# Patient Record
Sex: FEMALE | Race: WHITE | NOT HISPANIC OR LATINO | Employment: FULL TIME | URBAN - METROPOLITAN AREA
[De-identification: names, ages, dates, MRNs, and addresses within clinical notes are randomized per-mention and may not be internally consistent; named-entity substitution may affect disease eponyms.]

---

## 2019-11-06 PROBLEM — R60.9 EDEMA: Status: ACTIVE | Noted: 2018-08-03

## 2019-11-06 PROBLEM — R53.83 FATIGUE: Status: ACTIVE | Noted: 2018-09-04

## 2019-11-06 PROBLEM — E03.9 HYPOTHYROIDISM: Status: ACTIVE | Noted: 2018-09-04

## 2019-11-06 PROBLEM — E04.1 THYROID NODULE: Status: ACTIVE | Noted: 2018-09-04

## 2019-11-06 RX ORDER — MULTIVIT-MIN/IRON/FOLIC ACID/K 18-600-40
1 CAPSULE ORAL EVERY 24 HOURS
COMMUNITY
Start: 2018-09-04 | End: 2019-11-12

## 2019-11-06 RX ORDER — MAGNESIUM 200 MG
TABLET ORAL EVERY 24 HOURS
COMMUNITY
Start: 2018-09-04 | End: 2021-12-01

## 2019-11-06 RX ORDER — LEVOTHYROXINE SODIUM 0.12 MG/1
TABLET ORAL EVERY 24 HOURS
COMMUNITY
Start: 2019-05-21 | End: 2019-12-03 | Stop reason: SDUPTHER

## 2019-11-11 ENCOUNTER — TELEPHONE (OUTPATIENT)
Dept: ENDOCRINOLOGY | Facility: CLINIC | Age: 39
End: 2019-11-11

## 2019-11-12 ENCOUNTER — OFFICE VISIT (OUTPATIENT)
Dept: ENDOCRINOLOGY | Facility: CLINIC | Age: 39
End: 2019-11-12

## 2019-11-12 VITALS
OXYGEN SATURATION: 99 % | HEIGHT: 63 IN | DIASTOLIC BLOOD PRESSURE: 75 MMHG | HEART RATE: 86 BPM | BODY MASS INDEX: 38.62 KG/M2 | WEIGHT: 218 LBS | SYSTOLIC BLOOD PRESSURE: 118 MMHG

## 2019-11-12 DIAGNOSIS — R53.83 FATIGUE, UNSPECIFIED TYPE: ICD-10-CM

## 2019-11-12 DIAGNOSIS — E03.8 HYPOTHYROIDISM DUE TO HASHIMOTO'S THYROIDITIS: Primary | ICD-10-CM

## 2019-11-12 DIAGNOSIS — L68.0 HIRSUTISM: ICD-10-CM

## 2019-11-12 DIAGNOSIS — E04.1 THYROID NODULE: ICD-10-CM

## 2019-11-12 DIAGNOSIS — E06.3 HYPOTHYROIDISM DUE TO HASHIMOTO'S THYROIDITIS: Primary | ICD-10-CM

## 2019-11-12 PROCEDURE — 99214 OFFICE O/P EST MOD 30 MIN: CPT | Performed by: INTERNAL MEDICINE

## 2019-11-12 RX ORDER — DEXTROMETHORPHAN HYDROBROMIDE AND PROMETHAZINE HYDROCHLORIDE 15; 6.25 MG/5ML; MG/5ML
5 SYRUP ORAL
COMMUNITY
Start: 2017-12-09 | End: 2019-11-12

## 2019-11-12 RX ORDER — DEXAMETHASONE 1 MG
TABLET ORAL
Qty: 1 TABLET | Refills: 0 | OUTPATIENT
Start: 2019-11-12 | End: 2021-12-01

## 2019-11-12 RX ORDER — TOPIRAMATE 50 MG/1
TABLET, FILM COATED ORAL EVERY 12 HOURS SCHEDULED
COMMUNITY
End: 2021-12-01

## 2019-11-12 NOTE — PROGRESS NOTES
Endocrine Progress Note Outpatient     Patient Care Team:  Mira Faulkner APRN as PCP - General    Chief Complaint: Follow-up hypothyroidism    HPI: 39-year-old female with history of hypothyroidism second to Hashimoto's thyroiditis is here for follow-up.  She also have a small thyroid not in the right side.    For hypothyroidism she is currently on levothyroxine 137 mcg p.o. daily.  She is telling me that she is taking it on regular basis.  She does feel like extreme fatigue and tiredness, dry skin, hair loss, constipation joint pain, muscle aches, weight is fluctuating, cold intolerance.      Thyroid nodule she has a small subcentimeter on the right side.  There is family history with her aunt having history of thyroid cancer, she denies any history of radiation exposure, she does have a hoarseness in the morning when she wakes up and she feels like you know she may have some trouble swallowing.      She does have hirsutism,, she has been shaving up looking for last 7 years, she does not have major issues with acne.  Her periods were regular but now she had ablation done.  She had 3 babies/pregnancies without any difficulty.  She does have mood swings.  Is excessive sweating.    Past Medical History:   Diagnosis Date   • Hypothyroidism    • Thyroid nodule        Social History     Socioeconomic History   • Marital status:      Spouse name: Not on file   • Number of children: Not on file   • Years of education: Not on file   • Highest education level: Not on file   Tobacco Use   • Smoking status: Never Smoker   Substance and Sexual Activity   • Alcohol use: No     Frequency: Never       Family History   Problem Relation Age of Onset   • Hypertension Mother    • Heart failure Mother    • COPD Mother    • Diabetes Father    • Thyroid disease Sister    • Diabetes Paternal Grandfather    • Heart disease Paternal Grandfather    • Prostate cancer Paternal Grandfather        No Known Allergies    ROS:    Constitutional:  Admit fatigue, tiredness.    Eyes:  Denies change in visual acuity   HENT:  Denies nasal congestion or sore throat   Respiratory: denies cough, shortness of breath.   Cardiovascular:  denies chest pain, edema   GI:  Denies abdominal pain, nausea, vomiting.   Musculoskeletal:  Denies back pain or joint pain   Integument:  Admit dry skin, denies rash   Neurologic:  Denies headache, focal weakness or sensory changes   Endocrine:  Admit polyuria and polydipsia   Psychiatric:  Denies depression or anxiety      Vitals:    11/12/19 0831   BP: 118/75   Pulse: 86   SpO2: 99%       Physical Exam:  GEN: NAD, conversant, obese  EYES: EOMI, PERRL, no conjunctival erythema  NECK: no thyromegaly, full ROM   CV: RRR, no murmurs/rubs/gallops, no peripheral edema  LUNG: CTAB, no wheezes/rales/ronchi  SKIN: no rashes, no acanthosis  MSK: no deformities, full ROM of all extremities  NEURO: no tremors, DTR normal  PSYCH: AOX3, appropriate mood, affect normal      Results Review:     I reviewed the patient's new clinical results.    Labs from November 6, 2019 showed a TSH of 2.86, free T4 1.3, free T3 2.5, TPO antibodies 148 and vitamin B12 was 339  A thyroid ultrasound done on November 4, 2019 showed 8.9 cm hypoechoic nodule with a slightly irregular margin and there was no evidence of calcification and this nodule has mild increase in size.  Previously noted nodule was about 0.5 cm.      Medication Review: Reviewed.       Current Outpatient Medications:   •  Cyanocobalamin (B-12) 1000 MCG sublingual tablet, Daily., Disp: , Rfl:   •  levothyroxine (SYNTHROID, LEVOTHROID) 125 MCG tablet, Daily., Disp: , Rfl:   •  topiramate (TOPAMAX) 50 MG tablet, Every 12 (Twelve) Hours., Disp: , Rfl:       Assessment/Plan   1.  Hypothyroidism: Second to Hashimoto's thyroiditis, her TSH is completely normal with normal free T4 and free T3.  I do not believe her symptoms are due to thyroid at this time.  I will continue levothyroxine  125 mcg p.o. daily and follow labs.  2.  Thyroid nodule: She has a 9 mm hypoechoic nodule with a slightly irregular margins.  She has an aunt with history of thyroid cancer and this nodule has increased in size from 5 mm to 9 mm.  I am little bit concerned about this so I recommend biopsy of this nodule.  She has appointment with ENT week Monday and she will discuss with them.  I have advised her to call me back if they decide not to do biopsy then I will arrange for the biopsy.  3.  Excessive fatigue with hirsutism: Rule out PCOS, at this time I will check CMP, CBC, total and free testosterone, DHEAs, LH, FSH and dexamethasone suppression test for further evaluation.  Have advised her to continue vitamin B12 supplementation.          Marian Gillis MD FACE.    Much of the above report is an electronic transcription/translation of the spoken language to printed text using Dragon Software. As such, the subtleties and finesse of the spoken language may permit erroneous, or at times, nonsensical words or phrases to be inadvertently transcribed; thus changes may be made at a later date to rectify these errors.

## 2019-11-12 NOTE — PATIENT INSTRUCTIONS
Please start vitamin B12 supplementation  Please work on your diet and avoid processed foods  Please get all labs done fasting in next few days make sure you take dexamethasone 1 mg night before 11 PM and draw your serum cortisol next morning at 8 AM.  Please discuss with your ENT regarding the biopsy of this right 9 mm nodule and if they do not want to the left me know and I will arrange it.

## 2019-11-14 ENCOUNTER — TELEPHONE (OUTPATIENT)
Dept: ENDOCRINOLOGY | Facility: CLINIC | Age: 39
End: 2019-11-14

## 2019-11-14 NOTE — TELEPHONE ENCOUNTER
Pt just had CBC and CMP done 11/7 (scanned to chart). She wants to know if these need to be repeated?

## 2019-11-15 ENCOUNTER — LAB (OUTPATIENT)
Dept: LAB | Facility: HOSPITAL | Age: 39
End: 2019-11-15

## 2019-11-15 DIAGNOSIS — E03.8 HYPOTHYROIDISM DUE TO HASHIMOTO'S THYROIDITIS: ICD-10-CM

## 2019-11-15 DIAGNOSIS — R53.83 FATIGUE, UNSPECIFIED TYPE: ICD-10-CM

## 2019-11-15 DIAGNOSIS — E06.3 HYPOTHYROIDISM DUE TO HASHIMOTO'S THYROIDITIS: ICD-10-CM

## 2019-11-15 DIAGNOSIS — E04.1 THYROID NODULE: ICD-10-CM

## 2019-11-15 DIAGNOSIS — L68.0 HIRSUTISM: ICD-10-CM

## 2019-11-15 LAB
CORTIS SERPL-MCNC: 0.69 MCG/DL
FSH SERPL-ACNC: 1.81 MIU/ML
LH SERPL-ACNC: 2.85 MIU/ML

## 2019-11-15 PROCEDURE — 83001 ASSAY OF GONADOTROPIN (FSH): CPT

## 2019-11-15 PROCEDURE — 83002 ASSAY OF GONADOTROPIN (LH): CPT

## 2019-11-15 PROCEDURE — 82533 TOTAL CORTISOL: CPT

## 2019-11-15 PROCEDURE — 84403 ASSAY OF TOTAL TESTOSTERONE: CPT

## 2019-11-15 PROCEDURE — 84402 ASSAY OF FREE TESTOSTERONE: CPT

## 2019-11-15 PROCEDURE — 36415 COLL VENOUS BLD VENIPUNCTURE: CPT

## 2019-11-15 PROCEDURE — 82627 DEHYDROEPIANDROSTERONE: CPT

## 2019-11-16 LAB — DHEA-S SERPL-MCNC: 101.3 UG/DL (ref 57.3–279.2)

## 2019-11-17 LAB
TESTOST FREE SERPL-MCNC: 0.3 PG/ML (ref 0–4.2)
TESTOST SERPL-MCNC: 19 NG/DL (ref 8–48)

## 2019-11-19 ENCOUNTER — TELEPHONE (OUTPATIENT)
Dept: ENDOCRINOLOGY | Facility: CLINIC | Age: 39
End: 2019-11-19

## 2019-11-19 DIAGNOSIS — E06.3 HYPOTHYROIDISM DUE TO HASHIMOTO'S THYROIDITIS: ICD-10-CM

## 2019-11-19 DIAGNOSIS — E03.8 HYPOTHYROIDISM DUE TO HASHIMOTO'S THYROIDITIS: ICD-10-CM

## 2019-11-19 DIAGNOSIS — E04.1 THYROID NODULE: Primary | ICD-10-CM

## 2019-11-19 NOTE — TELEPHONE ENCOUNTER
Pt states that she saw Dr. Reis. He will not bx thyroid nodule. Pt states that she was told to let you know.

## 2019-11-19 NOTE — TELEPHONE ENCOUNTER
Ok.  If she wants to proceed with biopsy I will be happy to arrange it otherwise we can follow this nodule in about 4 to 6 months with ultrasound.

## 2019-11-21 NOTE — TELEPHONE ENCOUNTER
S/w pt. She prefers to do thyroid us in 4-6 months. Order entered and will fax to [facility] when signed. She will call to schedule.    **order signed and faxed to [facility]

## 2019-12-03 RX ORDER — LEVOTHYROXINE SODIUM 0.12 MG/1
TABLET ORAL
Qty: 90 TABLET | Refills: 0 | Status: SHIPPED | OUTPATIENT
Start: 2019-12-03 | End: 2020-03-16

## 2020-03-16 RX ORDER — LEVOTHYROXINE SODIUM 0.12 MG/1
125 TABLET ORAL DAILY
Qty: 30 TABLET | Refills: 0 | Status: SHIPPED | OUTPATIENT
Start: 2022-07-21 | End: 2022-12-20 | Stop reason: SDUPTHER

## 2020-06-09 ENCOUNTER — APPOINTMENT (OUTPATIENT)
Dept: CT IMAGING | Facility: HOSPITAL | Age: 40
End: 2020-06-09

## 2020-06-09 ENCOUNTER — HOSPITAL ENCOUNTER (EMERGENCY)
Facility: HOSPITAL | Age: 40
Discharge: HOME | End: 2020-06-09
Attending: EMERGENCY MEDICINE | Admitting: EMERGENCY MEDICINE

## 2020-06-09 VITALS
OXYGEN SATURATION: 99 % | RESPIRATION RATE: 16 BRPM | HEIGHT: 63 IN | BODY MASS INDEX: 38.55 KG/M2 | WEIGHT: 217.59 LBS | TEMPERATURE: 98.1 F | DIASTOLIC BLOOD PRESSURE: 65 MMHG | HEART RATE: 86 BPM | SYSTOLIC BLOOD PRESSURE: 111 MMHG

## 2020-06-09 DIAGNOSIS — R11.2 NAUSEA AND VOMITING, INTRACTABILITY OF VOMITING NOT SPECIFIED, UNSPECIFIED VOMITING TYPE: ICD-10-CM

## 2020-06-09 DIAGNOSIS — R10.84 GENERALIZED ABDOMINAL PAIN: Primary | ICD-10-CM

## 2020-06-09 LAB
ANION GAP SERPL CALCULATED.3IONS-SCNC: 14 MMOL/L (ref 5–15)
ANISOCYTOSIS BLD QL: NORMAL
BASOPHILS # BLD AUTO: 0 10*3/MM3 (ref 0–0.2)
BASOPHILS NFR BLD AUTO: 0.1 % (ref 0–1.5)
BILIRUB UR QL STRIP: NEGATIVE
BUN BLD-MCNC: 20 MG/DL (ref 6–20)
BUN BLD-MCNC: ABNORMAL MG/DL
BUN/CREAT SERPL: ABNORMAL
CALCIUM SPEC-SCNC: 9.5 MG/DL (ref 8.6–10.5)
CHLORIDE SERPL-SCNC: 101 MMOL/L (ref 98–107)
CLARITY UR: CLEAR
CO2 SERPL-SCNC: 23 MMOL/L (ref 22–29)
COLOR UR: YELLOW
CREAT BLD-MCNC: 0.76 MG/DL (ref 0.57–1)
DEPRECATED RDW RBC AUTO: 39.4 FL (ref 37–54)
EOSINOPHIL # BLD AUTO: 0 10*3/MM3 (ref 0–0.4)
EOSINOPHIL NFR BLD AUTO: 0.2 % (ref 0.3–6.2)
ERYTHROCYTE [DISTWIDTH] IN BLOOD BY AUTOMATED COUNT: 12.8 % (ref 12.3–15.4)
GFR SERPL CREATININE-BSD FRML MDRD: 85 ML/MIN/1.73
GLUCOSE BLD-MCNC: 123 MG/DL (ref 65–99)
GLUCOSE UR STRIP-MCNC: NEGATIVE MG/DL
HCT VFR BLD AUTO: 45.3 % (ref 34–46.6)
HGB BLD-MCNC: 15.5 G/DL (ref 12–15.9)
HGB UR QL STRIP.AUTO: NEGATIVE
KETONES UR QL STRIP: ABNORMAL
LARGE PLATELETS: NORMAL
LEUKOCYTE ESTERASE UR QL STRIP.AUTO: NEGATIVE
LYMPHOCYTES # BLD AUTO: 0.6 10*3/MM3 (ref 0.7–3.1)
LYMPHOCYTES NFR BLD AUTO: 4.2 % (ref 19.6–45.3)
MCH RBC QN AUTO: 30.3 PG (ref 26.6–33)
MCHC RBC AUTO-ENTMCNC: 34.3 G/DL (ref 31.5–35.7)
MCV RBC AUTO: 88.5 FL (ref 79–97)
MONOCYTES # BLD AUTO: 0.2 10*3/MM3 (ref 0.1–0.9)
MONOCYTES NFR BLD AUTO: 1.2 % (ref 5–12)
NEUTROPHILS # BLD AUTO: 13.1 10*3/MM3 (ref 1.7–7)
NEUTROPHILS NFR BLD AUTO: 94.3 % (ref 42.7–76)
NITRITE UR QL STRIP: NEGATIVE
NRBC BLD AUTO-RTO: 0 /100 WBC (ref 0–0.2)
PH UR STRIP.AUTO: 8.5 [PH] (ref 5–8)
PLATELET # BLD AUTO: 183 10*3/MM3 (ref 140–450)
PMV BLD AUTO: 8.8 FL (ref 6–12)
POTASSIUM BLD-SCNC: 4.5 MMOL/L (ref 3.5–5.2)
PROT UR QL STRIP: ABNORMAL
RBC # BLD AUTO: 5.11 10*6/MM3 (ref 3.77–5.28)
SODIUM BLD-SCNC: 138 MMOL/L (ref 136–145)
SP GR UR STRIP: 1.03 (ref 1–1.03)
UROBILINOGEN UR QL STRIP: ABNORMAL
WBC MORPH BLD: NORMAL
WBC NRBC COR # BLD: 13.9 10*3/MM3 (ref 3.4–10.8)

## 2020-06-09 PROCEDURE — 25010000002 ONDANSETRON PER 1 MG: Performed by: EMERGENCY MEDICINE

## 2020-06-09 PROCEDURE — 85007 BL SMEAR W/DIFF WBC COUNT: CPT | Performed by: EMERGENCY MEDICINE

## 2020-06-09 PROCEDURE — 96375 TX/PRO/DX INJ NEW DRUG ADDON: CPT

## 2020-06-09 PROCEDURE — 81003 URINALYSIS AUTO W/O SCOPE: CPT | Performed by: EMERGENCY MEDICINE

## 2020-06-09 PROCEDURE — 74176 CT ABD & PELVIS W/O CONTRAST: CPT

## 2020-06-09 PROCEDURE — 25010000002 MORPHINE PER 10 MG: Performed by: EMERGENCY MEDICINE

## 2020-06-09 PROCEDURE — 80048 BASIC METABOLIC PNL TOTAL CA: CPT | Performed by: EMERGENCY MEDICINE

## 2020-06-09 PROCEDURE — 99283 EMERGENCY DEPT VISIT LOW MDM: CPT

## 2020-06-09 PROCEDURE — 96374 THER/PROPH/DIAG INJ IV PUSH: CPT

## 2020-06-09 PROCEDURE — 85025 COMPLETE CBC W/AUTO DIFF WBC: CPT | Performed by: EMERGENCY MEDICINE

## 2020-06-09 RX ORDER — MORPHINE SULFATE 4 MG/ML
2 INJECTION, SOLUTION INTRAMUSCULAR; INTRAVENOUS ONCE
Status: COMPLETED | OUTPATIENT
Start: 2020-06-09 | End: 2020-06-09

## 2020-06-09 RX ORDER — ONDANSETRON 2 MG/ML
4 INJECTION INTRAMUSCULAR; INTRAVENOUS ONCE
Status: COMPLETED | OUTPATIENT
Start: 2020-06-09 | End: 2020-06-09

## 2020-06-09 RX ORDER — SODIUM CHLORIDE 0.9 % (FLUSH) 0.9 %
10 SYRINGE (ML) INJECTION AS NEEDED
Status: DISCONTINUED | OUTPATIENT
Start: 2020-06-09 | End: 2020-06-09 | Stop reason: HOSPADM

## 2020-06-09 RX ORDER — PANTOPRAZOLE SODIUM 40 MG/1
40 TABLET, DELAYED RELEASE ORAL DAILY
Qty: 14 TABLET | Refills: 0 | OUTPATIENT
Start: 2020-06-09 | End: 2021-12-01

## 2020-06-09 RX ORDER — ONDANSETRON 4 MG/1
4 TABLET, ORALLY DISINTEGRATING ORAL EVERY 8 HOURS PRN
Qty: 12 TABLET | Refills: 0 | OUTPATIENT
Start: 2020-06-09 | End: 2021-12-01

## 2020-06-09 RX ADMIN — ONDANSETRON 4 MG: 2 INJECTION INTRAMUSCULAR; INTRAVENOUS at 14:20

## 2020-06-09 RX ADMIN — MORPHINE SULFATE 2 MG: 4 INJECTION INTRAVENOUS at 14:19

## 2020-06-09 NOTE — ED PROVIDER NOTES
Subjective   Patient is a 40-year-old female complaint abdominal pain and vomiting.  This developed over the past several hours.  Pain is moderate and constant there is no radiation of pain.  Denies cough fever diarrhea dysuria is weight loss or other associated complaints          Review of Systems  Negative for headache earache throat cough fever chest pain shortness of breath diarrhea dysuria denies weight loss or other complaint.  Past Medical History:   Diagnosis Date   • Hypothyroidism    • Thyroid nodule        No Known Allergies    Past Surgical History:   Procedure Laterality Date   •  SECTION      X 3    • GALLBLADDER SURGERY     • OVARY SURGERY      X 6        Family History   Problem Relation Age of Onset   • Hypertension Mother    • Heart failure Mother    • COPD Mother    • Diabetes Father    • Thyroid disease Sister    • Diabetes Paternal Grandfather    • Heart disease Paternal Grandfather    • Prostate cancer Paternal Grandfather        Social History     Socioeconomic History   • Marital status:      Spouse name: Not on file   • Number of children: Not on file   • Years of education: Not on file   • Highest education level: Not on file   Tobacco Use   • Smoking status: Never Smoker   Substance and Sexual Activity   • Alcohol use: No     Frequency: Never           Objective   Physical Exam  HEENT exam shows TMs to be clear.  Oropharynx comers but sclera nonicteric.  Neck has no adenopathy JVD or bruits.  Lungs are clear.  Heart has regular rhythm.  Chest is nontender.  Abdomen is soft with mild diffuse tenderness.  Patient has normal bowel sounds without rebound or guard.  Back has no CVA tenderness.  Procedures           ED Course            Results for orders placed or performed during the hospital encounter of 20   Basic Metabolic Panel   Result Value Ref Range    Glucose 123 (H) 65 - 99 mg/dL    BUN      Creatinine 0.76 0.57 - 1.00 mg/dL    Sodium 138 136 - 145 mmol/L     Potassium 4.5 3.5 - 5.2 mmol/L    Chloride 101 98 - 107 mmol/L    CO2 23.0 22.0 - 29.0 mmol/L    Calcium 9.5 8.6 - 10.5 mg/dL    eGFR Non African Amer 85 >60 mL/min/1.73    BUN/Creatinine Ratio      Anion Gap 14.0 5.0 - 15.0 mmol/L   Urinalysis With Culture If Indicated - Urine, Clean Catch   Result Value Ref Range    Color, UA Yellow Yellow, Straw    Appearance, UA Clear Clear    pH, UA 8.5 (H) 5.0 - 8.0    Specific Gravity, UA 1.026 1.005 - 1.030    Glucose, UA Negative Negative    Ketones, UA 15 mg/dL (1+) (A) Negative    Bilirubin, UA Negative Negative    Blood, UA Negative Negative    Protein, UA Trace (A) Negative    Leuk Esterase, UA Negative Negative    Nitrite, UA Negative Negative    Urobilinogen, UA 0.2 E.U./dL 0.2 - 1.0 E.U./dL   CBC Auto Differential   Result Value Ref Range    WBC 13.90 (H) 3.40 - 10.80 10*3/mm3    RBC 5.11 3.77 - 5.28 10*6/mm3    Hemoglobin 15.5 12.0 - 15.9 g/dL    Hematocrit 45.3 34.0 - 46.6 %    MCV 88.5 79.0 - 97.0 fL    MCH 30.3 26.6 - 33.0 pg    MCHC 34.3 31.5 - 35.7 g/dL    RDW 12.8 12.3 - 15.4 %    RDW-SD 39.4 37.0 - 54.0 fl    MPV 8.8 6.0 - 12.0 fL    Platelets 183 140 - 450 10*3/mm3    Neutrophil % 94.3 (H) 42.7 - 76.0 %    Lymphocyte % 4.2 (L) 19.6 - 45.3 %    Monocyte % 1.2 (L) 5.0 - 12.0 %    Eosinophil % 0.2 (L) 0.3 - 6.2 %    Basophil % 0.1 0.0 - 1.5 %    Neutrophils, Absolute 13.10 (H) 1.70 - 7.00 10*3/mm3    Lymphocytes, Absolute 0.60 (L) 0.70 - 3.10 10*3/mm3    Monocytes, Absolute 0.20 0.10 - 0.90 10*3/mm3    Eosinophils, Absolute 0.00 0.00 - 0.40 10*3/mm3    Basophils, Absolute 0.00 0.00 - 0.20 10*3/mm3    nRBC 0.0 0.0 - 0.2 /100 WBC   Scan Slide   Result Value Ref Range    Anisocytosis Slight/1+ None Seen    WBC Morphology Normal Normal    Large Platelets Slight/1+ None Seen   BUN   Result Value Ref Range    BUN 20 6 - 20 mg/dL     Ct Abdomen Pelvis Without Contrast    Result Date: 6/9/2020   1. No acute findings in the abdomen or pelvis. 2. Nonobstructing bilateral  renal stones. No ureteral stone or hydronephrosis. 3. The pedicles is normal. 4. 2.2 cm right ovarian cyst. 5. 891 noncalcified right lower lobe pulmonary nodule. Given the patient's age and lack of history of malignancy, benign etiology is favored. Following the Fleischner Society criteria for pulmonary nodule management, if this is a low-risk patient, CT chest follow-up in 6-12 months with consideration of additional 18-24 month follow-up is advised.    Electronically Signed By-Dr. Nitza Bhatti MD On:6/9/2020 2:27 PM This report was finalized on [id number] by Dr. Nitza Bhatti MD.                                      MDM  Number of Diagnoses or Management Options  Diagnosis management comments: Patient has a benign physical exam.  There is no evidence of intra-abdominal pathology including infectious process or metabolic abnormality.  Patient will be discharged with placed on Protonix and Zofran.  She will see MD for recheck as needed.    Risk of Complications, Morbidity, and/or Mortality  Presenting problems: high  Diagnostic procedures: high  Management options: high    Patient Progress  Patient progress: stable      Final diagnoses:   Generalized abdominal pain   Nausea and vomiting, intractability of vomiting not specified, unspecified vomiting type            Bean Melton MD  06/09/20 2170

## 2020-06-09 NOTE — ED NOTES
Pt reports that she woke up this morning with RLQ pain states no possibility of pregnancy denies diarrhea states she has n/v      Selina Khoury RN  06/09/20 6794

## 2020-06-10 RX ORDER — ALBUTEROL SULFATE 90 UG/1
AEROSOL, METERED RESPIRATORY (INHALATION) EVERY 4 HOURS
COMMUNITY
End: 2021-12-01

## 2020-06-10 RX ORDER — DEXTROMETHORPHAN HYDROBROMIDE AND PROMETHAZINE HYDROCHLORIDE 15; 6.25 MG/5ML; MG/5ML
5 SYRUP ORAL
COMMUNITY
Start: 2020-02-10 | End: 2021-12-01

## 2020-11-28 PROCEDURE — U0003 INFECTIOUS AGENT DETECTION BY NUCLEIC ACID (DNA OR RNA); SEVERE ACUTE RESPIRATORY SYNDROME CORONAVIRUS 2 (SARS-COV-2) (CORONAVIRUS DISEASE [COVID-19]), AMPLIFIED PROBE TECHNIQUE, MAKING USE OF HIGH THROUGHPUT TECHNOLOGIES AS DESCRIBED BY CMS-2020-01-R: HCPCS | Performed by: NURSE PRACTITIONER

## 2021-12-01 PROCEDURE — U0004 COV-19 TEST NON-CDC HGH THRU: HCPCS | Performed by: NURSE PRACTITIONER

## 2021-12-03 RX ORDER — ALBUTEROL SULFATE 90 UG/1
2 AEROSOL, METERED RESPIRATORY (INHALATION) EVERY 4 HOURS PRN
Qty: 1 G | Refills: 1 | Status: SHIPPED | OUTPATIENT
Start: 2021-12-03 | End: 2022-04-11

## 2021-12-20 PROCEDURE — U0004 COV-19 TEST NON-CDC HGH THRU: HCPCS | Performed by: NURSE PRACTITIONER

## 2022-04-11 ENCOUNTER — OFFICE VISIT (OUTPATIENT)
Dept: CARDIOLOGY | Facility: CLINIC | Age: 42
End: 2022-04-11

## 2022-04-11 VITALS
HEIGHT: 63 IN | HEART RATE: 74 BPM | SYSTOLIC BLOOD PRESSURE: 120 MMHG | OXYGEN SATURATION: 100 % | WEIGHT: 220 LBS | BODY MASS INDEX: 38.98 KG/M2 | DIASTOLIC BLOOD PRESSURE: 80 MMHG

## 2022-04-11 DIAGNOSIS — E06.3 HYPOTHYROIDISM DUE TO HASHIMOTO'S THYROIDITIS: ICD-10-CM

## 2022-04-11 DIAGNOSIS — R60.0 LOCALIZED EDEMA: ICD-10-CM

## 2022-04-11 DIAGNOSIS — R03.0 ELEVATED BLOOD-PRESSURE READING WITHOUT DIAGNOSIS OF HYPERTENSION: Primary | ICD-10-CM

## 2022-04-11 DIAGNOSIS — R53.83 FATIGUE, UNSPECIFIED TYPE: ICD-10-CM

## 2022-04-11 DIAGNOSIS — E03.8 HYPOTHYROIDISM DUE TO HASHIMOTO'S THYROIDITIS: ICD-10-CM

## 2022-04-11 DIAGNOSIS — R03.0 ELEVATED BLOOD PRESSURE READING WITHOUT DIAGNOSIS OF HYPERTENSION: ICD-10-CM

## 2022-04-11 PROCEDURE — 99204 OFFICE O/P NEW MOD 45 MIN: CPT | Performed by: NURSE PRACTITIONER

## 2022-04-11 PROCEDURE — 93000 ELECTROCARDIOGRAM COMPLETE: CPT | Performed by: NURSE PRACTITIONER

## 2022-04-12 PROBLEM — R60.0 BILATERAL LEG EDEMA: Status: ACTIVE | Noted: 2022-04-12

## 2022-04-12 PROBLEM — R03.0 ELEVATED BLOOD-PRESSURE READING WITHOUT DIAGNOSIS OF HYPERTENSION: Status: ACTIVE | Noted: 2022-04-12

## 2022-04-12 NOTE — PROGRESS NOTES
Cardiology Office Consultation      Encounter Date:  04/11/2022    Patient ID:   Keyonna Kumari is a 42 y.o. female.    Reason For Consultation:  Elevated blood pressure    History of Present Illness:  Dear Dr. Kimble, Octavio Crockett MD    It was my pleasure to see Keyonna in new consultation today.  She is a 42-year-old female with no known history of coronary artery disease or structural heart disease.  Her past medical history includes Hashimoto's disease/hypothyroidism, thyroid nodule.  Patient presents today in new consultation for concern of elevated blood pressure.  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  Headache was generalized, nonfocal.  She denies any vision changes, nausea or vomiting.  The staff in the office took her blood pressure and it was quite elevated.  She had no focal deficits.  She reports that her headache lasted for much of the day and eventually subsided.  Her blood pressure improved as well.  She has had no further symptoms.  Given her history of thyroid disease and Hashimoto's disease, she wanted to have further evaluation.  She does have some chronic lower extremity edema for which she takes hydrochlorothiazide with improvement.  There is history of hypertension and congestive heart failure in mother, heart disease in paternal grandfather.  She has had no prior cardiac evaluation.  In the office today her blood pressure is well controlled at 120/80.  She denies any recent chest pain, pressure, tightness or palpitations.  She denies any dizziness, lightheadedness, near syncopal or syncopal episodes.  No claudication.        Assessment & Plan    Impressions:  Elevated blood pressure without the diagnosis of hypertension  Acute cephalgia  Hypothyroidism due to Hashimoto's thyroiditis  Lower extremity edema      Recommendations:  2D echocardiogram to evaluate LV and valvular function.  Evaluate for secondary  "hypertension with urine catecholamines/metanephrines, check TSH, serum aldosterone, CBC, BMP.  Continue hydralazine  Follow-up after testing    Objective:    Vitals:  Vitals:    04/11/22 1450   BP: 120/80   Pulse: 74   SpO2: 100%   Weight: 99.8 kg (220 lb)   Height: 160 cm (63\")       Review of Systems   Constitutional: Positive for malaise/fatigue.   Cardiovascular: Positive for leg swelling.   All other systems reviewed and are negative.      Review of Systems:  The following systems were reviewed as they relate to the cardiovascular system: Constitutional, Eyes, ENT, Cardiovascular, Respiratory, Gastrointestinal, Integumentary, Neurological, Psychiatric, Hematologic, Endocrine, Musculoskeletal, and Genitourinary. The pertinent cardiovascular findings are reported above with all other cardiovascular points within those systems being negative.    Physical Exam:     General: Alert, cooperative, no distress, appears stated age  Head:  Normocephalic, atraumatic, mucous membranes moist  Eyes:  Conjunctiva/corneas clear, EOM's intact     Neck:  Supple,  no adenopathy;      Lungs: Clear to auscultation bilaterally, no wheezes rhonchi rales are noted  Chest wall: No tenderness  Heart::  Regular rate and rhythm, S1 and S2 normal, no murmur, rub or gallop  Musculoskeletal:   Ambulates freely without assistance  Abdomen: Soft, non-tender, nondistended bowel sounds active  Extremities: No cyanosis, clubbing, or edema  Pulses: 2+ and symmetric all extremities  Skin:  No rashes or lesions  Neuro/psych: A&O x3. CN II through XII are grossly intact with appropriate affect    History of Present Illness      ECG 12 Lead    Date/Time: 4/11/2022 2:55 PM  Performed by: Barbara Hickey APRN  Authorized by: Barbara Hickey APRN   Comparison: not compared with previous ECG   Previous ECG: no previous ECG available  Rhythm: sinus rhythm  BPM: 76              Allergies:  No Known Allergies    Medication Review:     Current " "Outpatient Medications:   •  hydroCHLOROthiazide (HYDRODIURIL) 25 MG tablet, hydrochlorothiazide 25 mg tablet  TAKE 1 TABLET BY MOUTH EVERY DAY, Disp: , Rfl:   •  levothyroxine (SYNTHROID, LEVOTHROID) 125 MCG tablet, TAKE 1 TABLET BY MOUTH DAILY, Disp: 90 tablet, Rfl: 1    Family History:  Family History   Problem Relation Age of Onset   • Hypertension Mother    • Heart failure Mother    • COPD Mother    • Diabetes Father    • Thyroid disease Sister    • Diabetes Paternal Grandfather    • Heart disease Paternal Grandfather    • Prostate cancer Paternal Grandfather        Past Medical History:  Past Medical History:   Diagnosis Date   • Hashimoto's disease    • Hashimoto's disease    • Hypothyroidism    • Thyroid nodule        Past surgical History:  Past Surgical History:   Procedure Laterality Date   •  SECTION      X 3    • GALLBLADDER SURGERY     • LASER ABLATION     • OVARY SURGERY      X 6        Social History:  Social History     Socioeconomic History   • Marital status:    Tobacco Use   • Smoking status: Never Smoker   • Smokeless tobacco: Never Used   Vaping Use   • Vaping Use: Never used   Substance and Sexual Activity   • Alcohol use: No   • Drug use: Defer   • Sexual activity: Defer           NOTE: The following portions of the patient's history were reviewed and updated this visit as appropriate: allergies, current medications, past family history, past medical history, past social history, past surgical history and problem list.    EMR Dragon/Transcription:   \"Dictated utilizing Dragon dictation\".     "

## 2022-04-15 ENCOUNTER — APPOINTMENT (OUTPATIENT)
Dept: GENERAL RADIOLOGY | Facility: HOSPITAL | Age: 42
End: 2022-04-15

## 2022-04-15 ENCOUNTER — HOSPITAL ENCOUNTER (EMERGENCY)
Facility: HOSPITAL | Age: 42
Discharge: HOME | End: 2022-04-15
Attending: EMERGENCY MEDICINE | Admitting: EMERGENCY MEDICINE

## 2022-04-15 ENCOUNTER — APPOINTMENT (OUTPATIENT)
Dept: CT IMAGING | Facility: HOSPITAL | Age: 42
End: 2022-04-15

## 2022-04-15 VITALS
HEIGHT: 64 IN | HEART RATE: 69 BPM | DIASTOLIC BLOOD PRESSURE: 60 MMHG | OXYGEN SATURATION: 99 % | BODY MASS INDEX: 37.56 KG/M2 | TEMPERATURE: 98.3 F | RESPIRATION RATE: 14 BRPM | WEIGHT: 220 LBS | SYSTOLIC BLOOD PRESSURE: 110 MMHG
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
## 2022-04-15 DIAGNOSIS — S16.1XXA STRAIN OF NECK MUSCLE, INITIAL ENCOUNTER: ICD-10-CM

## 2022-04-15 DIAGNOSIS — M25.78 CERVICAL OSTEOPHYTE: ICD-10-CM

## 2022-04-15 DIAGNOSIS — S39.012A BACK STRAIN, INITIAL ENCOUNTER: ICD-10-CM

## 2022-04-15 LAB

## 2022-04-15 PROCEDURE — 25010000002 KETOROLAC TROMETHAMINE PER 15 MG

## 2022-04-15 PROCEDURE — 63710000001 ONDANSETRON ODT 4 MG TABLET DISPERSIBLE

## 2022-04-15 PROCEDURE — 96372 THER/PROPH/DIAG INJ SC/IM: CPT

## 2022-04-15 PROCEDURE — 99283 EMERGENCY DEPT VISIT LOW MDM: CPT

## 2022-04-15 PROCEDURE — 72050 X-RAY EXAM NECK SPINE 4/5VWS: CPT

## 2022-04-15 PROCEDURE — 71045 X-RAY EXAM CHEST 1 VIEW: CPT

## 2022-04-15 PROCEDURE — 81001 URINALYSIS AUTO W/SCOPE: CPT

## 2022-04-15 PROCEDURE — 72125 CT NECK SPINE W/O DYE: CPT

## 2022-04-15 RX ORDER — KETOROLAC TROMETHAMINE 30 MG/ML
30 INJECTION, SOLUTION INTRAMUSCULAR; INTRAVENOUS ONCE
Status: COMPLETED | OUTPATIENT
Start: 2022-04-15 | End: 2022-04-15

## 2022-04-15 RX ORDER — ONDANSETRON 4 MG/1
4 TABLET, ORALLY DISINTEGRATING ORAL ONCE
Status: COMPLETED | OUTPATIENT
Start: 2022-04-15 | End: 2022-04-15

## 2022-04-15 RX ADMIN — KETOROLAC TROMETHAMINE 30 MG: 30 INJECTION, SOLUTION INTRAMUSCULAR at 11:40

## 2022-04-15 RX ADMIN — ONDANSETRON 4 MG: 4 TABLET, ORALLY DISINTEGRATING ORAL at 11:40

## 2022-04-15 NOTE — DISCHARGE INSTRUCTIONS
Follow up with neuro/spine specialist  Follow-up with primary care  Tylenol or ibuprofen as needed for discomfort  Use heat or ice to the areas of discomfort    Rest    Follow-up with primary care
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
## 2022-04-26 ENCOUNTER — HOSPITAL ENCOUNTER (OUTPATIENT)
Dept: CARDIOLOGY | Facility: HOSPITAL | Age: 42
Discharge: HOME | End: 2022-04-26
Admitting: NURSE PRACTITIONER

## 2022-04-26 VITALS
WEIGHT: 220 LBS | HEIGHT: 64 IN | SYSTOLIC BLOOD PRESSURE: 123 MMHG | HEART RATE: 85 BPM | BODY MASS INDEX: 37.56 KG/M2 | DIASTOLIC BLOOD PRESSURE: 74 MMHG

## 2022-04-26 DIAGNOSIS — R60.0 LOCALIZED EDEMA: ICD-10-CM

## 2022-04-26 DIAGNOSIS — E03.8 HYPOTHYROIDISM DUE TO HASHIMOTO'S THYROIDITIS: ICD-10-CM

## 2022-04-26 DIAGNOSIS — R03.0 ELEVATED BLOOD PRESSURE READING WITHOUT DIAGNOSIS OF HYPERTENSION: ICD-10-CM

## 2022-04-26 DIAGNOSIS — E06.3 HYPOTHYROIDISM DUE TO HASHIMOTO'S THYROIDITIS: ICD-10-CM

## 2022-04-26 DIAGNOSIS — R53.83 FATIGUE, UNSPECIFIED TYPE: ICD-10-CM

## 2022-04-26 PROCEDURE — 93306 TTE W/DOPPLER COMPLETE: CPT

## 2022-04-28 LAB
AORTIC ARCH: 2.9 CM
ASCENDING AORTA: 2.6 CM
BH CV ECHO MEAS - ACS: 2.33 CM
BH CV ECHO MEAS - AO MAX PG: 8.9 MMHG
BH CV ECHO MEAS - AO MEAN PG: 5.1 MMHG
BH CV ECHO MEAS - AO ROOT DIAM: 3 CM
BH CV ECHO MEAS - AO V2 MAX: 148.9 CM/SEC
BH CV ECHO MEAS - AO V2 VTI: 29.9 CM
BH CV ECHO MEAS - AVA(I,D): 2.5 CM2
BH CV ECHO MEAS - EDV(CUBED): 93.6 ML
BH CV ECHO MEAS - EDV(MOD-SP2): 78.2 ML
BH CV ECHO MEAS - EDV(MOD-SP4): 80.6 ML
BH CV ECHO MEAS - EF(MOD-BP): 61 %
BH CV ECHO MEAS - EF(MOD-SP2): 64.3 %
BH CV ECHO MEAS - EF(MOD-SP4): 57.6 %
BH CV ECHO MEAS - ESV(CUBED): 27 ML
BH CV ECHO MEAS - ESV(MOD-SP2): 27.9 ML
BH CV ECHO MEAS - ESV(MOD-SP4): 34.2 ML
BH CV ECHO MEAS - FS: 33.9 %
BH CV ECHO MEAS - IVS/LVPW: 1.11 CM
BH CV ECHO MEAS - IVSD: 0.88 CM
BH CV ECHO MEAS - LA DIMENSION: 3.5 CM
BH CV ECHO MEAS - LAT PEAK E' VEL: 14 CM/SEC
BH CV ECHO MEAS - LV DIASTOLIC VOL/BSA (35-75): 39.8 CM2
BH CV ECHO MEAS - LV MASS(C)D: 121.4 GRAMS
BH CV ECHO MEAS - LV MAX PG: 5.8 MMHG
BH CV ECHO MEAS - LV MEAN PG: 3.2 MMHG
BH CV ECHO MEAS - LV SYSTOLIC VOL/BSA (12-30): 16.9 CM2
BH CV ECHO MEAS - LV V1 MAX: 120.3 CM/SEC
BH CV ECHO MEAS - LV V1 VTI: 25.7 CM
BH CV ECHO MEAS - LVIDD: 4.5 CM
BH CV ECHO MEAS - LVIDS: 3 CM
BH CV ECHO MEAS - LVOT AREA: 2.9 CM2
BH CV ECHO MEAS - LVOT DIAM: 1.94 CM
BH CV ECHO MEAS - LVPWD: 0.79 CM
BH CV ECHO MEAS - MED PEAK E' VEL: 9 CM/SEC
BH CV ECHO MEAS - MV A MAX VEL: 102.2 CM/SEC
BH CV ECHO MEAS - MV DEC SLOPE: 577.9 CM/SEC2
BH CV ECHO MEAS - MV DEC TIME: 0.18 MSEC
BH CV ECHO MEAS - MV E MAX VEL: 106.6 CM/SEC
BH CV ECHO MEAS - MV E/A: 1.04
BH CV ECHO MEAS - MV MAX PG: 5.8 MMHG
BH CV ECHO MEAS - MV MEAN PG: 2.5 MMHG
BH CV ECHO MEAS - MV P1/2T: 51 MSEC
BH CV ECHO MEAS - MV V2 VTI: 25.1 CM
BH CV ECHO MEAS - MVA(P1/2T): 4.3 CM2
BH CV ECHO MEAS - MVA(VTI): 3 CM2
BH CV ECHO MEAS - PA ACC SLOPE: 737 CM/SEC2
BH CV ECHO MEAS - PA ACC TIME: 0.1 SEC
BH CV ECHO MEAS - PA PR(ACCEL): 35.6 MMHG
BH CV ECHO MEAS - PA V2 MAX: 92.2 CM/SEC
BH CV ECHO MEAS - PAPD(PI EDV): 6 MMHG
BH CV ECHO MEAS - PI END-D VEL: 85.3 CM/SEC
BH CV ECHO MEAS - PULM A REVS DUR: 0.07 SEC
BH CV ECHO MEAS - PULM A REVS VEL: 33.3 CM/SEC
BH CV ECHO MEAS - PULM DIAS VEL: 66 CM/SEC
BH CV ECHO MEAS - PULM SYS VEL: 76.8 CM/SEC
BH CV ECHO MEAS - RAP SYSTOLE: 3 MMHG
BH CV ECHO MEAS - RV MAX PG: 2.42 MMHG
BH CV ECHO MEAS - RV V1 MAX: 77.7 CM/SEC
BH CV ECHO MEAS - RV V1 VTI: 14.2 CM
BH CV ECHO MEAS - RVOT DIAM: 2.46 CM
BH CV ECHO MEAS - RVSP: 18.8 MMHG
BH CV ECHO MEAS - SI(MOD-SP2): 24.8 ML/M2
BH CV ECHO MEAS - SI(MOD-SP4): 22.9 ML/M2
BH CV ECHO MEAS - SUP REN AO DIAM: 1.9 CM
BH CV ECHO MEAS - SV(LVOT): 75.7 ML
BH CV ECHO MEAS - SV(MOD-SP2): 50.3 ML
BH CV ECHO MEAS - SV(MOD-SP4): 46.4 ML
BH CV ECHO MEAS - SV(RVOT): 67.5 ML
BH CV ECHO MEAS - TAPSE (>1.6): 2.7 CM
BH CV ECHO MEAS - TR MAX PG: 15.8 MMHG
BH CV ECHO MEAS - TR MAX VEL: 197.7 CM/SEC
BH CV ECHO MEASUREMENTS AVERAGE E/E' RATIO: 9.27
BH CV VAS BP LEFT ARM: NORMAL MMHG
BH CV XLRA - RV BASE: 3 CM
BH CV XLRA - RV MID: 2.1 CM
BH CV XLRA - TDI S': 16 CM/SEC
IVRT: 59 MSEC
LEFT ATRIUM VOLUME INDEX: 16 ML/M2
LEFT ATRIUM VOLUME: 33 CM3
LV EF 2D ECHO EST: 60 %
MAXIMAL PREDICTED HEART RATE: 180 BPM
PV VALVE AREA: 4.1 CM2
STRESS TARGET HR: 153 BPM

## 2022-04-28 PROCEDURE — 93306 TTE W/DOPPLER COMPLETE: CPT | Performed by: INTERNAL MEDICINE

## 2022-05-16 RX ORDER — AZITHROMYCIN 250 MG/1
TABLET, FILM COATED ORAL
COMMUNITY

## 2022-05-16 RX ORDER — DEXAMETHASONE 4 MG/1
TABLET ORAL
COMMUNITY

## 2022-05-16 RX ORDER — LEVOTHYROXINE SODIUM 125 UG/1
CAPSULE ORAL
COMMUNITY

## 2022-05-16 RX ORDER — SILVER SULFADIAZINE 1 %
CREAM (GRAM) TOPICAL
COMMUNITY
Start: 2022-03-10

## 2022-11-29 ENCOUNTER — HOSPITAL ENCOUNTER (OUTPATIENT)
Facility: HOSPITAL | Age: 42
Discharge: HOME | End: 2022-11-29
Attending: EMERGENCY MEDICINE

## 2022-11-29 ENCOUNTER — APPOINTMENT (OUTPATIENT)
Dept: GENERAL RADIOLOGY | Facility: HOSPITAL | Age: 42
End: 2022-11-29

## 2022-11-29 VITALS
RESPIRATION RATE: 16 BRPM | DIASTOLIC BLOOD PRESSURE: 101 MMHG | SYSTOLIC BLOOD PRESSURE: 146 MMHG | HEART RATE: 90 BPM | WEIGHT: 230 LBS | OXYGEN SATURATION: 99 % | BODY MASS INDEX: 40.75 KG/M2 | HEIGHT: 63 IN | TEMPERATURE: 98.4 F

## 2022-11-29 DIAGNOSIS — S39.012A LUMBOSACRAL STRAIN, INITIAL ENCOUNTER: Primary | ICD-10-CM

## 2022-11-29 PROCEDURE — EDLOS: Performed by: EMERGENCY MEDICINE

## 2022-11-29 PROCEDURE — G0463 HOSPITAL OUTPT CLINIC VISIT: HCPCS | Performed by: EMERGENCY MEDICINE

## 2022-11-29 PROCEDURE — 25010000002 DEXAMETHASONE PER 1 MG: Performed by: EMERGENCY MEDICINE

## 2022-11-29 PROCEDURE — 99214 OFFICE O/P EST MOD 30 MIN: CPT | Performed by: EMERGENCY MEDICINE

## 2022-11-29 PROCEDURE — 72110 X-RAY EXAM L-2 SPINE 4/>VWS: CPT | Performed by: EMERGENCY MEDICINE

## 2022-11-29 PROCEDURE — 25010000002 KETOROLAC TROMETHAMINE PER 15 MG: Performed by: EMERGENCY MEDICINE

## 2022-11-29 PROCEDURE — 96372 THER/PROPH/DIAG INJ SC/IM: CPT | Performed by: EMERGENCY MEDICINE

## 2022-11-29 PROCEDURE — 72110 X-RAY EXAM L-2 SPINE 4/>VWS: CPT

## 2022-11-29 RX ORDER — PREDNISONE 20 MG/1
20 TABLET ORAL 2 TIMES DAILY
Qty: 10 TABLET | Refills: 0 | Status: SHIPPED | OUTPATIENT
Start: 2022-11-29

## 2022-11-29 RX ORDER — DICLOFENAC SODIUM 75 MG/1
75 TABLET, DELAYED RELEASE ORAL 2 TIMES DAILY
Qty: 30 TABLET | Refills: 0 | Status: SHIPPED | OUTPATIENT
Start: 2022-11-29

## 2022-11-29 RX ORDER — KETOROLAC TROMETHAMINE 30 MG/ML
60 INJECTION, SOLUTION INTRAMUSCULAR; INTRAVENOUS ONCE
Status: COMPLETED | OUTPATIENT
Start: 2022-11-29 | End: 2022-11-29

## 2022-11-29 RX ADMIN — DEXAMETHASONE SODIUM PHOSPHATE 10 MG: 10 INJECTION, SOLUTION INTRAMUSCULAR; INTRAVENOUS at 13:11

## 2022-11-29 RX ADMIN — KETOROLAC TROMETHAMINE 60 MG: 30 INJECTION, SOLUTION INTRAMUSCULAR at 13:11

## 2022-12-13 RX ORDER — LEVOTHYROXINE SODIUM 0.12 MG/1
125 TABLET ORAL DAILY
Qty: 30 TABLET | Refills: 0 | Status: CANCELLED | OUTPATIENT
Start: 2022-12-13

## 2022-12-14 RX ORDER — LEVOTHYROXINE SODIUM 0.12 MG/1
125 TABLET ORAL DAILY
Qty: 30 TABLET | Refills: 0 | Status: CANCELLED | OUTPATIENT
Start: 2022-12-13

## 2022-12-16 RX ORDER — LEVOTHYROXINE SODIUM 0.12 MG/1
125 TABLET ORAL DAILY
Qty: 30 TABLET | Refills: 0 | Status: CANCELLED | OUTPATIENT
Start: 2022-12-13

## 2022-12-19 RX ORDER — LEVOTHYROXINE SODIUM 0.12 MG/1
125 TABLET ORAL DAILY
Qty: 30 TABLET | Refills: 0 | Status: CANCELLED | OUTPATIENT
Start: 2022-12-13

## 2023-05-11 ENCOUNTER — TELEPHONE (OUTPATIENT)
Dept: ENDOCRINOLOGY | Age: 43
End: 2023-05-11

## 2023-05-11 ENCOUNTER — TELEPHONE (OUTPATIENT)
Dept: ENDOCRINOLOGY | Facility: CLINIC | Age: 43
End: 2023-05-11
Payer: COMMERCIAL

## 2023-05-11 NOTE — TELEPHONE ENCOUNTER
Caller: Keyonna Kumari    Relationship: Self    Best call back number: [phone]    Who is your current provider: ADAM VALENTIN    Who would you like your new provider to be: NAYE VILLEGAS MD     What are your reasons for transferring care: LOOKING TO TRANSFER CARE FOR A FEMALE PROVIDER     Additional notes: OK TO CALL BACK CELL PHONE NUMBER

## 2023-05-11 NOTE — TELEPHONE ENCOUNTER
Caller: Keyonna Kumari    Relationship: Self    Best call back number:[phone]    Who is your current provider:ADAM VALENTIN    Who would you like your new provider to be: NAYE VILLEGAS    What are your reasons for transferring care: LOOKING TO TRANSFER CARE FOR A FEMALE PROVIDER     Additional notes: OK TO CALL BACK MOBILE CELL NUMBER

## 2023-05-15 NOTE — TELEPHONE ENCOUNTER
patient called back returning Phillip's call. Can we call the patient back when we get the chance?

## 2023-08-21 ENCOUNTER — OFFICE VISIT (OUTPATIENT)
Dept: CARDIOLOGY | Facility: CLINIC | Age: 43
End: 2023-08-21
Payer: COMMERCIAL

## 2023-08-21 VITALS
BODY MASS INDEX: 39.87 KG/M2 | SYSTOLIC BLOOD PRESSURE: 114 MMHG | OXYGEN SATURATION: 100 % | DIASTOLIC BLOOD PRESSURE: 78 MMHG | HEART RATE: 71 BPM | HEIGHT: 63 IN | WEIGHT: 225 LBS

## 2023-08-21 DIAGNOSIS — R07.9 CHEST PAIN, UNSPECIFIED TYPE: Primary | ICD-10-CM

## 2023-08-21 DIAGNOSIS — E06.3 HYPOTHYROIDISM DUE TO HASHIMOTO'S THYROIDITIS: ICD-10-CM

## 2023-08-21 DIAGNOSIS — E03.8 HYPOTHYROIDISM DUE TO HASHIMOTO'S THYROIDITIS: ICD-10-CM

## 2023-08-21 PROCEDURE — 93000 ELECTROCARDIOGRAM COMPLETE: CPT | Performed by: NURSE PRACTITIONER

## 2023-08-21 PROCEDURE — 99214 OFFICE O/P EST MOD 30 MIN: CPT | Performed by: NURSE PRACTITIONER

## 2023-08-21 NOTE — PROGRESS NOTES
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     Patient has no prior cardiac history of ischemic heart disease.  2D echo 2022 shows an EF of 60% with trace MR.  She has a past medical history of hypothyroidism/Hashimoto's disease and elevated blood pressure without diagnosis hypertension in the past.  Her mother, whom has a history of blood clots,  unexpectedly in her sleep at the age of 68 with the cause of death unknown.    Patient complains of intermittent chest discomfort over the past few weeks.  She describes this as a midsternal discomfort radiating through her chest to the middle of her back accompanied by a feeling of shortness of breath and lightheadedness which occurs at rest and resolves spontaneously within a few minutes.  She denies any accompanying palpitations with this.  She complains of a hot flushing sensation and appears red-faced currently.  She denies chest pain currently.  She is typically active taking her children to sporting events and has not experienced the past discomfort specifically with exertion.        ASSESSMENT/PLAN:      Diagnoses and all orders for this visit:    1. Chest pain, unspecified type (Primary)  -     Comprehensive Metabolic Panel; Future  -     TSH; Future  -     Lipid Panel; Future  -     CT Cardiac Calcium Score Without Dye; Future  -     D-dimer, Quantitative; Future    2. Hypothyroidism due to Hashimoto's thyroiditis            MEDICAL DECISION MAKING:    Will check TSH, CMP, and a D-dimer.  Will check fasting lipid panel and coronary calcium scan for further risk stratification.  Discussed signs and symptoms of when to go to the ER.  Further recommendations on future testing and follow-up  pending test results.          Past Medical History:   Diagnosis Date    Hashimoto's disease     Hashimoto's disease     Hypothyroidism     Thyroid nodule        Past Surgical History:   Procedure Laterality Date     SECTION      X 3     GALLBLADDER SURGERY      LASER ABLATION      OVARY SURGERY      X 6          Current Outpatient Medications:     diclofenac (VOLTAREN) 75 MG EC tablet, Take 1 tablet by mouth 2 (Two) Times a Day., Disp: 30 tablet, Rfl: 0    gabapentin (NEURONTIN) 300 MG capsule, Take 1 capsule by mouth 3 (Three) Times a Day., Disp: 30 capsule, Rfl: 1    hydroCHLOROthiazide (HYDRODIURIL) 25 MG tablet, Take 1 tablet by mouth Daily., Disp: 30 tablet, Rfl: 5    levothyroxine (SYNTHROID, LEVOTHROID) 125 MCG tablet, TAKE 1 TABLET BY MOUTH EVERY DAY, Disp: 90 tablet, Rfl: 3    levothyroxine (SYNTHROID, LEVOTHROID) 125 MCG tablet, TAKE 1 TABLET BY MOUTH EVERY DAY, Disp: 90 tablet, Rfl: 3    predniSONE (DELTASONE) 20 MG tablet, Take 1 tablet by mouth 2 (Two) Times a Day., Disp: 10 tablet, Rfl: 0    vitamin B-12 (CYANOCOBALAMIN) 1000 MCG tablet, Take 1 tablet by mouth once daily, Disp: 30 tablet, Rfl: 11    vitamin D (ERGOCALCIFEROL) 1.25 MG (96990 UT) capsule capsule, Take 1 capsule by mouth once weekly, Disp: 4 capsule, Rfl: 11    Social History     Socioeconomic History    Marital status:    Tobacco Use    Smoking status: Never    Smokeless tobacco: Never   Vaping Use    Vaping Use: Never used   Substance and Sexual Activity    Alcohol use: No    Drug use: Defer    Sexual activity: Defer       Family History   Problem Relation Age of Onset    Hypertension Mother     Heart failure Mother     COPD Mother     Diabetes Father     Thyroid disease Sister     Diabetes Paternal Grandfather     Heart disease Paternal Grandfather     Prostate cancer Paternal Grandfather        The following portions of the patient's history were reviewed and updated as appropriate: allergies, current medications, past  "family history, past medical history, past social history, past surgical history and problem list.    ROS  /78   Pulse 71   Ht 160 cm (63\")   Wt 102 kg (225 lb)   SpO2 100%   BMI 39.86 kg/mý .  Objective     Physical Exam    Physical Exam:  Neuro:  CV:  Resp:  GI:  Ext:  Pysch: AAOx3, no gross deficits  S1S2 RRR, no murmur  Non-labored, CTA  BS+, abd soft  Pedal pulses palp, non-pitting BLE edema  Calm and cooperative  HEENT: facial flushing present       Procedures    EKG ordered by and reviewed by me in office  EKG shows sinus rhythm with nonspecific T wave abnormalities.  There is no significant change from prior study.       "

## 2023-09-09 ENCOUNTER — HOSPITAL ENCOUNTER (OUTPATIENT)
Dept: CT IMAGING | Facility: HOSPITAL | Age: 43
Discharge: HOME | End: 2023-09-09

## 2023-09-09 ENCOUNTER — LAB (OUTPATIENT)
Dept: LAB | Facility: HOSPITAL | Age: 43
End: 2023-09-09

## 2023-09-09 DIAGNOSIS — R07.9 CHEST PAIN, UNSPECIFIED TYPE: ICD-10-CM

## 2023-09-09 LAB
ALBUMIN SERPL-MCNC: 4.7 G/DL (ref 3.5–5.2)
ALBUMIN/GLOB SERPL: 1.9 G/DL
ALP SERPL-CCNC: 77 U/L (ref 39–117)
ALT SERPL W P-5'-P-CCNC: 21 U/L (ref 1–33)
ANION GAP SERPL CALCULATED.3IONS-SCNC: 10 MMOL/L (ref 5–15)
AST SERPL-CCNC: 17 U/L (ref 1–32)
BILIRUB SERPL-MCNC: 0.2 MG/DL (ref 0–1.2)
BUN SERPL-MCNC: 21 MG/DL (ref 6–20)
BUN/CREAT SERPL: 23.6 (ref 7–25)
CALCIUM SPEC-SCNC: 9.7 MG/DL (ref 8.6–10.5)
CHLORIDE SERPL-SCNC: 103 MMOL/L (ref 98–107)
CHOLEST SERPL-MCNC: 251 MG/DL (ref 0–200)
CO2 SERPL-SCNC: 27 MMOL/L (ref 22–29)
CREAT SERPL-MCNC: 0.89 MG/DL (ref 0.57–1)
D DIMER PPP FEU-MCNC: 0.26 MG/L (FEU) (ref 0–0.5)
EGFRCR SERPLBLD CKD-EPI 2021: 83.7 ML/MIN/1.73
GLOBULIN UR ELPH-MCNC: 2.5 GM/DL
GLUCOSE SERPL-MCNC: 123 MG/DL (ref 65–99)
HDLC SERPL-MCNC: 58 MG/DL (ref 40–60)
LDLC SERPL CALC-MCNC: 142 MG/DL (ref 0–100)
LDLC/HDLC SERPL: 2.36 {RATIO}
POTASSIUM SERPL-SCNC: 4.5 MMOL/L (ref 3.5–5.2)
PROT SERPL-MCNC: 7.2 G/DL (ref 6–8.5)
SODIUM SERPL-SCNC: 140 MMOL/L (ref 136–145)
TRIGL SERPL-MCNC: 281 MG/DL (ref 0–150)
TSH SERPL DL<=0.05 MIU/L-ACNC: 4.14 UIU/ML (ref 0.27–4.2)
VLDLC SERPL-MCNC: 51 MG/DL (ref 5–40)

## 2023-09-09 PROCEDURE — 80053 COMPREHEN METABOLIC PANEL: CPT

## 2023-09-09 PROCEDURE — 84443 ASSAY THYROID STIM HORMONE: CPT

## 2023-09-09 PROCEDURE — 36415 COLL VENOUS BLD VENIPUNCTURE: CPT

## 2023-09-09 PROCEDURE — 85379 FIBRIN DEGRADATION QUANT: CPT

## 2023-09-09 PROCEDURE — 80061 LIPID PANEL: CPT

## 2023-09-09 PROCEDURE — 75571 CT HRT W/O DYE W/CA TEST: CPT

## 2023-09-11 ENCOUNTER — TELEPHONE (OUTPATIENT)
Dept: CARDIOLOGY | Facility: CLINIC | Age: 43
End: 2023-09-11
Payer: COMMERCIAL

## 2023-09-11 DIAGNOSIS — R93.1 AGATSTON CORONARY ARTERY CALCIUM SCORE LESS THAN 100: ICD-10-CM

## 2023-09-11 DIAGNOSIS — R07.9 CHEST PAIN, UNSPECIFIED TYPE: Primary | ICD-10-CM

## 2023-09-11 NOTE — TELEPHONE ENCOUNTER
Results discussed.  Very low coronary calcium score, but cannot exclude soft plaque.  Patient with c/o chest pain, so will proceed with exercise treadmill test.

## 2023-09-25 ENCOUNTER — HOSPITAL ENCOUNTER (OUTPATIENT)
Dept: CARDIOLOGY | Facility: HOSPITAL | Age: 43
Discharge: HOME | End: 2023-09-25
Payer: COMMERCIAL

## 2023-10-10 ENCOUNTER — HOSPITAL ENCOUNTER (OUTPATIENT)
Dept: CARDIOLOGY | Facility: HOSPITAL | Age: 43
Discharge: HOME | End: 2023-10-10
Admitting: NURSE PRACTITIONER
Payer: COMMERCIAL

## 2023-10-10 DIAGNOSIS — R07.9 CHEST PAIN, UNSPECIFIED TYPE: ICD-10-CM

## 2023-10-10 DIAGNOSIS — R93.1 AGATSTON CORONARY ARTERY CALCIUM SCORE LESS THAN 100: ICD-10-CM

## 2023-10-10 LAB
BH CV STRESS BP STAGE 1: NORMAL
BH CV STRESS BP STAGE 2: NORMAL
BH CV STRESS BP STAGE 3: NORMAL
BH CV STRESS DURATION MIN STAGE 1: 3
BH CV STRESS DURATION MIN STAGE 2: 3
BH CV STRESS DURATION MIN STAGE 3: 2
BH CV STRESS DURATION SEC STAGE 1: 0
BH CV STRESS DURATION SEC STAGE 2: 0
BH CV STRESS DURATION SEC STAGE 3: 1
BH CV STRESS GRADE STAGE 1: 10
BH CV STRESS GRADE STAGE 2: 12
BH CV STRESS GRADE STAGE 3: 14
BH CV STRESS HR STAGE 1: 131
BH CV STRESS HR STAGE 2: 153
BH CV STRESS HR STAGE 3: 171
BH CV STRESS METS STAGE 1: 5
BH CV STRESS METS STAGE 2: 7.5
BH CV STRESS METS STAGE 3: 10.1
BH CV STRESS PROTOCOL 1: NORMAL
BH CV STRESS RECOVERY BP: NORMAL MMHG
BH CV STRESS RECOVERY HR: 110 BPM
BH CV STRESS SPEED STAGE 1: 1.7
BH CV STRESS SPEED STAGE 2: 2.5
BH CV STRESS SPEED STAGE 3: 3.4
BH CV STRESS STAGE 1: 1
BH CV STRESS STAGE 2: 2
BH CV STRESS STAGE 3: 3
MAXIMAL PREDICTED HEART RATE: 179 BPM
PERCENT MAX PREDICTED HR: 95.53 %
STRESS BASELINE BP: NORMAL MMHG
STRESS BASELINE HR: 100 BPM
STRESS PERCENT HR: 112 %
STRESS POST ESTIMATED WORKLOAD: 10.1 METS
STRESS POST EXERCISE DUR MIN: 8 MIN
STRESS POST EXERCISE DUR SEC: 1 SEC
STRESS POST PEAK BP: NORMAL MMHG
STRESS POST PEAK HR: 171 BPM
STRESS TARGET HR: 152 BPM

## 2023-10-10 PROCEDURE — 93017 CV STRESS TEST TRACING ONLY: CPT

## 2023-10-10 PROCEDURE — 93016 CV STRESS TEST SUPVJ ONLY: CPT | Performed by: INTERNAL MEDICINE

## 2023-10-10 PROCEDURE — 93018 CV STRESS TEST I&R ONLY: CPT | Performed by: INTERNAL MEDICINE

## 2023-10-13 ENCOUNTER — TELEPHONE (OUTPATIENT)
Dept: CARDIOLOGY | Facility: CLINIC | Age: 43
End: 2023-10-13
Payer: COMMERCIAL

## 2024-03-23 ENCOUNTER — LAB (OUTPATIENT)
Dept: LAB | Facility: HOSPITAL | Age: 44
End: 2024-03-23
Payer: COMMERCIAL

## 2024-03-23 ENCOUNTER — TRANSCRIBE ORDERS (OUTPATIENT)
Dept: ADMINISTRATIVE | Facility: HOSPITAL | Age: 44
End: 2024-03-23
Payer: COMMERCIAL

## 2024-03-23 DIAGNOSIS — R60.0 LOCALIZED EDEMA: Primary | ICD-10-CM

## 2024-03-23 DIAGNOSIS — M19.90 SENILE ARTHRITIS: ICD-10-CM

## 2024-03-23 DIAGNOSIS — E06.3 CHRONIC LYMPHOCYTIC THYROIDITIS: ICD-10-CM

## 2024-03-23 DIAGNOSIS — E55.9 VITAMIN D DEFICIENCY: ICD-10-CM

## 2024-03-23 DIAGNOSIS — R60.0 LOCALIZED EDEMA: ICD-10-CM

## 2024-03-23 DIAGNOSIS — Z00.01 ENCOUNTER FOR GENERAL ADULT MEDICAL EXAMINATION WITH ABNORMAL FINDINGS: ICD-10-CM

## 2024-03-23 DIAGNOSIS — R53.83 TIREDNESS: ICD-10-CM

## 2024-03-23 DIAGNOSIS — R73.9 BLOOD GLUCOSE ELEVATED: ICD-10-CM

## 2024-03-23 DIAGNOSIS — R53.83 EXHAUSTION: ICD-10-CM

## 2024-03-23 LAB
25(OH)D3 SERPL-MCNC: 26.7 NG/ML (ref 30–100)
ALBUMIN SERPL-MCNC: 4.5 G/DL (ref 3.5–5.2)
ALBUMIN/GLOB SERPL: 1.8 G/DL
ALP SERPL-CCNC: 78 U/L (ref 39–117)
ALT SERPL W P-5'-P-CCNC: 14 U/L (ref 1–33)
ANION GAP SERPL CALCULATED.3IONS-SCNC: 9 MMOL/L (ref 5–15)
AST SERPL-CCNC: 15 U/L (ref 1–32)
BASOPHILS # BLD AUTO: 0.03 10*3/MM3 (ref 0–0.2)
BASOPHILS NFR BLD AUTO: 0.5 % (ref 0–1.5)
BILIRUB SERPL-MCNC: 0.3 MG/DL (ref 0–1.2)
BUN SERPL-MCNC: 17 MG/DL (ref 6–20)
BUN/CREAT SERPL: 18.5 (ref 7–25)
CALCIUM SPEC-SCNC: 9.2 MG/DL (ref 8.6–10.5)
CHLORIDE SERPL-SCNC: 100 MMOL/L (ref 98–107)
CHOLEST SERPL-MCNC: 234 MG/DL (ref 0–200)
CHROMATIN AB SERPL-ACNC: <10 IU/ML (ref 0–14)
CO2 SERPL-SCNC: 30 MMOL/L (ref 22–29)
CORTIS SERPL-MCNC: 15 MCG/DL
CREAT SERPL-MCNC: 0.92 MG/DL (ref 0.57–1)
CRP SERPL-MCNC: 0.31 MG/DL (ref 0–0.5)
DEPRECATED RDW RBC AUTO: 39.4 FL (ref 37–54)
EGFRCR SERPLBLD CKD-EPI 2021: 79.9 ML/MIN/1.73
EOSINOPHIL # BLD AUTO: 0.19 10*3/MM3 (ref 0–0.4)
EOSINOPHIL NFR BLD AUTO: 3 % (ref 0.3–6.2)
ERYTHROCYTE [DISTWIDTH] IN BLOOD BY AUTOMATED COUNT: 12.1 % (ref 12.3–15.4)
ERYTHROCYTE [SEDIMENTATION RATE] IN BLOOD: 4 MM/HR (ref 0–20)
GLOBULIN UR ELPH-MCNC: 2.5 GM/DL
GLUCOSE SERPL-MCNC: 125 MG/DL (ref 65–99)
HBA1C MFR BLD: 6 % (ref 4.8–5.6)
HCT VFR BLD AUTO: 44.8 % (ref 34–46.6)
HDLC SERPL-MCNC: 67 MG/DL (ref 40–60)
HGB BLD-MCNC: 14.7 G/DL (ref 12–15.9)
IMM GRANULOCYTES # BLD AUTO: 0.01 10*3/MM3 (ref 0–0.05)
IMM GRANULOCYTES NFR BLD AUTO: 0.2 % (ref 0–0.5)
LDLC SERPL CALC-MCNC: 141 MG/DL (ref 0–100)
LDLC/HDLC SERPL: 2.06 {RATIO}
LYMPHOCYTES # BLD AUTO: 2.18 10*3/MM3 (ref 0.7–3.1)
LYMPHOCYTES NFR BLD AUTO: 34.6 % (ref 19.6–45.3)
MCH RBC QN AUTO: 28.7 PG (ref 26.6–33)
MCHC RBC AUTO-ENTMCNC: 32.8 G/DL (ref 31.5–35.7)
MCV RBC AUTO: 87.5 FL (ref 79–97)
MONOCYTES # BLD AUTO: 0.19 10*3/MM3 (ref 0.1–0.9)
MONOCYTES NFR BLD AUTO: 3 % (ref 5–12)
NEUTROPHILS NFR BLD AUTO: 3.7 10*3/MM3 (ref 1.7–7)
NEUTROPHILS NFR BLD AUTO: 58.7 % (ref 42.7–76)
NRBC BLD AUTO-RTO: 0 /100 WBC (ref 0–0.2)
PLATELET # BLD AUTO: 252 10*3/MM3 (ref 140–450)
PMV BLD AUTO: 10.2 FL (ref 6–12)
POTASSIUM SERPL-SCNC: 3.8 MMOL/L (ref 3.5–5.2)
PROT SERPL-MCNC: 7 G/DL (ref 6–8.5)
RBC # BLD AUTO: 5.12 10*6/MM3 (ref 3.77–5.28)
SODIUM SERPL-SCNC: 139 MMOL/L (ref 136–145)
T3FREE SERPL-MCNC: 2.88 PG/ML (ref 2–4.4)
T4 FREE SERPL-MCNC: 1.5 NG/DL (ref 0.93–1.7)
TRIGL SERPL-MCNC: 146 MG/DL (ref 0–150)
TSH SERPL DL<=0.05 MIU/L-ACNC: 2.45 UIU/ML (ref 0.27–4.2)
URATE SERPL-MCNC: 5.5 MG/DL (ref 2.4–5.7)
VIT B12 BLD-MCNC: 342 PG/ML (ref 211–946)
VLDLC SERPL-MCNC: 26 MG/DL (ref 5–40)
WBC NRBC COR # BLD AUTO: 6.3 10*3/MM3 (ref 3.4–10.8)

## 2024-03-23 PROCEDURE — 36415 COLL VENOUS BLD VENIPUNCTURE: CPT

## 2024-03-23 PROCEDURE — 83036 HEMOGLOBIN GLYCOSYLATED A1C: CPT

## 2024-03-23 PROCEDURE — 82533 TOTAL CORTISOL: CPT

## 2024-03-23 PROCEDURE — 84439 ASSAY OF FREE THYROXINE: CPT

## 2024-03-23 PROCEDURE — 80061 LIPID PANEL: CPT

## 2024-03-23 PROCEDURE — 85732 THROMBOPLASTIN TIME PARTIAL: CPT

## 2024-03-23 PROCEDURE — 86431 RHEUMATOID FACTOR QUANT: CPT

## 2024-03-23 PROCEDURE — 82530 CORTISOL FREE: CPT

## 2024-03-23 PROCEDURE — 82607 VITAMIN B-12: CPT

## 2024-03-23 PROCEDURE — 80050 GENERAL HEALTH PANEL: CPT

## 2024-03-23 PROCEDURE — 85613 RUSSELL VIPER VENOM DILUTED: CPT

## 2024-03-23 PROCEDURE — 86140 C-REACTIVE PROTEIN: CPT

## 2024-03-23 PROCEDURE — 85670 THROMBIN TIME PLASMA: CPT

## 2024-03-23 PROCEDURE — 85705 THROMBOPLASTIN INHIBITION: CPT

## 2024-03-23 PROCEDURE — 84481 FREE ASSAY (FT-3): CPT

## 2024-03-23 PROCEDURE — 85652 RBC SED RATE AUTOMATED: CPT

## 2024-03-23 PROCEDURE — 86063 ANTISTREPTOLYSIN O SCREEN: CPT

## 2024-03-23 PROCEDURE — 82306 VITAMIN D 25 HYDROXY: CPT

## 2024-03-23 PROCEDURE — 84550 ASSAY OF BLOOD/URIC ACID: CPT

## 2024-03-24 LAB — ASO AB SERPL-ACNC: NEGATIVE [IU]/ML

## 2024-03-25 LAB
APTT SCREEN TO CONFIRM RATIO: 1.15 RATIO (ref 0–1.34)
CONFIRM APTT/NORMAL: 41.6 SEC (ref 0–47.6)
LA 2 SCREEN W REFLEX-IMP: NORMAL
SCREEN APTT: 38.1 SEC (ref 0–43.5)
SCREEN DRVVT: 43.6 SEC (ref 0–47)
THROMBIN TIME: 20.6 SEC (ref 0–23)

## 2024-03-30 LAB — CORTIS F SERPL-MCNC: 0.81 UG/DL

## 2024-04-11 ENCOUNTER — OFFICE VISIT (OUTPATIENT)
Dept: ENDOCRINOLOGY | Age: 44
End: 2024-04-11
Payer: COMMERCIAL

## 2024-04-11 VITALS
BODY MASS INDEX: 38.62 KG/M2 | HEART RATE: 78 BPM | HEIGHT: 63 IN | DIASTOLIC BLOOD PRESSURE: 82 MMHG | OXYGEN SATURATION: 97 % | WEIGHT: 218 LBS | SYSTOLIC BLOOD PRESSURE: 118 MMHG

## 2024-04-11 DIAGNOSIS — R73.03 PREDIABETES: ICD-10-CM

## 2024-04-11 DIAGNOSIS — E03.8 HYPOTHYROIDISM DUE TO HASHIMOTO'S THYROIDITIS: Primary | ICD-10-CM

## 2024-04-11 DIAGNOSIS — E04.1 THYROID NODULE: ICD-10-CM

## 2024-04-11 DIAGNOSIS — E55.9 VITAMIN D DEFICIENCY: ICD-10-CM

## 2024-04-11 DIAGNOSIS — E06.3 HYPOTHYROIDISM DUE TO HASHIMOTO'S THYROIDITIS: Primary | ICD-10-CM

## 2024-04-11 RX ORDER — CYCLOBENZAPRINE HCL 10 MG
1 TABLET ORAL 3 TIMES DAILY
COMMUNITY

## 2024-04-11 RX ORDER — LEVOTHYROXINE SODIUM 0.12 MG/1
125 TABLET ORAL DAILY
COMMUNITY

## 2024-04-11 NOTE — ASSESSMENT & PLAN NOTE
Biochemically euthyroid  Complains of feeling heat and cold intolerance and constipation  Continue the current dose of Synthroid 125 mcg for now  Repeat TFT in 3 months and adjust the dose of Synthroid accordingly

## 2024-04-11 NOTE — PROGRESS NOTES
"Chief Complaint  Hypothyroidism due to Hashimoto's thyroiditis    Subjective        Keyonna Kumari presents to [facility]  to establish care      History of Present Illness      Referred for further management of Hashimoto's thyroiditis  Diagnosed with hypothyroidism in 2016 since then has been taking Synthroid  The current dose of Synthroid is 125 mcg daily  Takes it early morning on empty stomach before breakfast    Complains of palpitation and sees cardiologist  Has cold and heat intolerance and constipation  Takes hydrochlorothiazide for lower extremity swelling    Takes vitamin D 50,000 units weekly      History of thyroid nodule  Complains of intermittent neck discomfort  Denies radiation exposure  Paternal aunt had thyroid cancer    Had uterine ablation in 2018      Component      Latest Ref Rng 3/23/2024   Cortisol        mcg/dL 15.00    T3, Free      2.00 - 4.40 pg/mL 2.88    Free T4      0.93 - 1.70 ng/dL 1.50    TSH Baseline      0.270 - 4.200 uIU/mL 2.450    Hemoglobin A1C      4.80 - 5.60 % 6.00 (H)    25 Hydroxy, Vitamin D      30.0 - 100.0 ng/ml 26.7 (L)    Cortisol, Free Dialysis, LCMS      ug/dL 0.815       Legend:  (H) High  (L) Low    Objective   Vital Signs:  /82 (BP Location: Right arm, Patient Position: Sitting, Cuff Size: Adult)   Pulse 78   Ht 160 cm (62.99\")   Wt 98.9 kg (218 lb)   SpO2 97%   BMI 38.63 kg/m²   Estimated body mass index is 38.63 kg/m² as calculated from the following:    Height as of this encounter: 160 cm (62.99\").    Weight as of this encounter: 98.9 kg (218 lb).             Review of Systems   Constitutional:  Negative for fatigue and unexpected weight change.   Cardiovascular:  Positive for palpitations.   Gastrointestinal:  Positive for constipation.   Endocrine: Positive for cold intolerance and heat intolerance.        Physical Exam  Constitutional:       Appearance: She is obese.   HENT:      Head: Normocephalic and " atraumatic.   Eyes:      Extraocular Movements: Extraocular movements intact.   Cardiovascular:      Rate and Rhythm: Normal rate and regular rhythm.   Pulmonary:      Effort: Pulmonary effort is normal.      Breath sounds: Normal breath sounds. No wheezing.   Abdominal:      Palpations: Abdomen is soft.      Tenderness: There is no abdominal tenderness.   Musculoskeletal:         General: No swelling. Normal range of motion.      Cervical back: Neck supple. No tenderness.   Neurological:      Mental Status: She is alert and oriented to person, place, and time.   Psychiatric:         Mood and Affect: Mood normal.        Result Review :  The following data was reviewed by: Mahrokh Nokhbehzaeim, MD on 04/11/2024:  CMP          9/9/2023    08:09 3/23/2024    09:50   CMP   Glucose 123  125    BUN 21  17    Creatinine 0.89  0.92    EGFR 83.7  79.9    Sodium 140  139    Potassium 4.5  3.8    Chloride 103  100    Calcium 9.7  9.2    Total Protein 7.2  7.0    Albumin 4.7  4.5    Globulin 2.5  2.5    Total Bilirubin 0.2  0.3    Alkaline Phosphatase 77  78    AST (SGOT) 17  15    ALT (SGPT) 21  14    Albumin/Globulin Ratio 1.9  1.8    BUN/Creatinine Ratio 23.6  18.5    Anion Gap 10.0  9.0      CBC w/diff          3/23/2024    09:50   CBC w/Diff   WBC 6.30    RBC 5.12    Hemoglobin 14.7    Hematocrit 44.8    MCV 87.5    MCH 28.7    MCHC 32.8    RDW 12.1    Platelets 252    Neutrophil Rel % 58.7    Immature Granulocyte Rel % 0.2    Lymphocyte Rel % 34.6    Monocyte Rel % 3.0    Eosinophil Rel % 3.0    Basophil Rel % 0.5      Lipid Panel          9/9/2023    08:09 3/23/2024    09:50   Lipid Panel   Total Cholesterol 251  234    Triglycerides 281  146    HDL Cholesterol 58  67    VLDL Cholesterol 51  26    LDL Cholesterol  142  141    LDL/HDL Ratio 2.36  2.06      TSH          9/9/2023    08:09 3/23/2024    09:50   TSH   TSH 4.140  2.450      CMP          9/9/2023    08:09 3/23/2024    09:50   CMP   Glucose 123  125    BUN 21  17     Creatinine 0.89  0.92    EGFR 83.7  79.9    Sodium 140  139    Potassium 4.5  3.8    Chloride 103  100    Calcium 9.7  9.2    Total Protein 7.2  7.0    Albumin 4.7  4.5    Globulin 2.5  2.5    Total Bilirubin 0.2  0.3    Alkaline Phosphatase 77  78    AST (SGOT) 17  15    ALT (SGPT) 21  14    Albumin/Globulin Ratio 1.9  1.8    BUN/Creatinine Ratio 23.6  18.5    Anion Gap 10.0  9.0       Lipid Panel          9/9/2023    08:09 3/23/2024    09:50   Lipid Panel   Total Cholesterol 251  234    Triglycerides 281  146    HDL Cholesterol 58  67    VLDL Cholesterol 51  26    LDL Cholesterol  142  141    LDL/HDL Ratio 2.36  2.06       Most Recent A1C          3/23/2024    09:50   HGBA1C Most Recent   Hemoglobin A1C 6.00          A1C Last 3 Results          3/23/2024    09:50   HGBA1C Last 3 Results   Hemoglobin A1C 6.00          TSH          9/9/2023    08:09 3/23/2024    09:50   TSH   TSH 4.140  2.450       Free T4   Date Value Ref Range Status   03/23/2024 1.50 0.93 - 1.70 ng/dL Final      T3, Free   Date Value Ref Range Status   03/23/2024 2.88 2.00 - 4.40 pg/mL Final      25 Hydroxy, Vitamin D   Date Value Ref Range Status   03/23/2024 26.7 (L) 30.0 - 100.0 ng/ml Final      Cortisol   Date Value Ref Range Status   03/23/2024 15.00   mcg/dL Final      Data reviewed : Radiologic studies thyroid ultrasound             Assessment and Plan   Diagnoses and all orders for this visit:    1. Hypothyroidism due to Hashimoto's thyroiditis (Primary)  Assessment & Plan:  Biochemically euthyroid  Complains of feeling heat and cold intolerance and constipation  Continue the current dose of Synthroid 125 mcg for now  Repeat TFT in 3 months and adjust the dose of Synthroid accordingly    Orders:  -     TSH; Future  -     T4, Free; Future    2. Thyroid nodule  Assessment & Plan:  Denies radiation exposure  Paternal aunt had thyroid cancer  Repeat thyroid ultrasound     Orders:  -     US Thyroid; Future    3. Vitamin D  deficiency  Assessment & Plan:  Takes vitamin D 50,000 units weekly  Repeat vitamin D level in 3 months    Orders:  -     Vitamin D,25-Hydroxy; Future    4. Prediabetes  Assessment & Plan:  Trying to watch her diet  Repeat hemoglobin A1c and CMP in 3 months    Orders:  -     Hemoglobin A1c; Future  -     Comprehensive Metabolic Panel; Future             Follow Up   Return in about 6 months (around 10/11/2024).  Patient was given instructions and counseling regarding her condition or for health maintenance advice. Please see specific information pulled into the AVS if appropriate.

## 2024-04-19 ENCOUNTER — HOSPITAL ENCOUNTER (OUTPATIENT)
Dept: ULTRASOUND IMAGING | Facility: HOSPITAL | Age: 44
Discharge: HOME | End: 2024-04-19
Admitting: STUDENT IN AN ORGANIZED HEALTH CARE EDUCATION/TRAINING PROGRAM
Payer: COMMERCIAL

## 2024-04-19 DIAGNOSIS — E04.1 THYROID NODULE: ICD-10-CM

## 2024-04-19 PROCEDURE — 76536 US EXAM OF HEAD AND NECK: CPT

## 2024-10-14 ENCOUNTER — TELEPHONE (OUTPATIENT)
Dept: ENDOCRINOLOGY | Age: 44
End: 2024-10-14
Payer: COMMERCIAL

## 2024-10-17 ENCOUNTER — LAB (OUTPATIENT)
Dept: LAB | Facility: HOSPITAL | Age: 44
End: 2024-10-17
Payer: COMMERCIAL

## 2024-10-17 DIAGNOSIS — R73.03 PREDIABETES: ICD-10-CM

## 2024-10-17 DIAGNOSIS — R53.83 EXHAUSTION: ICD-10-CM

## 2024-10-17 DIAGNOSIS — M19.90 SENILE ARTHRITIS: ICD-10-CM

## 2024-10-17 DIAGNOSIS — R73.9 BLOOD GLUCOSE ELEVATED: ICD-10-CM

## 2024-10-17 DIAGNOSIS — E06.3 CHRONIC LYMPHOCYTIC THYROIDITIS: ICD-10-CM

## 2024-10-17 DIAGNOSIS — E06.3 HYPOTHYROIDISM DUE TO HASHIMOTO'S THYROIDITIS: ICD-10-CM

## 2024-10-17 DIAGNOSIS — R60.0 LOCALIZED EDEMA: ICD-10-CM

## 2024-10-17 DIAGNOSIS — R53.83 TIREDNESS: ICD-10-CM

## 2024-10-17 DIAGNOSIS — Z00.01 ENCOUNTER FOR GENERAL ADULT MEDICAL EXAMINATION WITH ABNORMAL FINDINGS: ICD-10-CM

## 2024-10-17 DIAGNOSIS — E55.9 VITAMIN D DEFICIENCY: ICD-10-CM

## 2024-10-17 LAB
25(OH)D3 SERPL-MCNC: 47.3 NG/ML (ref 30–100)
ALBUMIN SERPL-MCNC: 4.2 G/DL (ref 3.5–5.2)
ALBUMIN/GLOB SERPL: 1.6 G/DL
ALP SERPL-CCNC: 66 U/L (ref 39–117)
ALT SERPL W P-5'-P-CCNC: 13 U/L (ref 1–33)
ANION GAP SERPL CALCULATED.3IONS-SCNC: 9.5 MMOL/L (ref 5–15)
AST SERPL-CCNC: 13 U/L (ref 1–32)
BILIRUB SERPL-MCNC: 0.4 MG/DL (ref 0–1.2)
BUN SERPL-MCNC: 18 MG/DL (ref 6–20)
BUN/CREAT SERPL: 20 (ref 7–25)
CALCIUM SPEC-SCNC: 9.2 MG/DL (ref 8.6–10.5)
CHLORIDE SERPL-SCNC: 102 MMOL/L (ref 98–107)
CO2 SERPL-SCNC: 29.5 MMOL/L (ref 22–29)
CREAT SERPL-MCNC: 0.9 MG/DL (ref 0.57–1)
EGFRCR SERPLBLD CKD-EPI 2021: 82 ML/MIN/1.73
GLOBULIN UR ELPH-MCNC: 2.6 GM/DL
GLUCOSE SERPL-MCNC: 84 MG/DL (ref 65–99)
HBA1C MFR BLD: 5.6 % (ref 4.8–5.6)
POTASSIUM SERPL-SCNC: 3.9 MMOL/L (ref 3.5–5.2)
PROT SERPL-MCNC: 6.8 G/DL (ref 6–8.5)
SODIUM SERPL-SCNC: 141 MMOL/L (ref 136–145)
T4 FREE SERPL-MCNC: 1.35 NG/DL (ref 0.92–1.68)
TSH SERPL DL<=0.05 MIU/L-ACNC: 5.48 UIU/ML (ref 0.27–4.2)

## 2024-10-17 PROCEDURE — 82306 VITAMIN D 25 HYDROXY: CPT

## 2024-10-17 PROCEDURE — 86038 ANTINUCLEAR ANTIBODIES: CPT

## 2024-10-17 PROCEDURE — 84439 ASSAY OF FREE THYROXINE: CPT

## 2024-10-17 PROCEDURE — 83036 HEMOGLOBIN GLYCOSYLATED A1C: CPT

## 2024-10-17 PROCEDURE — 80053 COMPREHEN METABOLIC PANEL: CPT

## 2024-10-17 PROCEDURE — 36415 COLL VENOUS BLD VENIPUNCTURE: CPT

## 2024-10-17 PROCEDURE — 84443 ASSAY THYROID STIM HORMONE: CPT

## 2024-10-18 LAB — ANA SER QL: NEGATIVE

## 2024-10-24 ENCOUNTER — OFFICE VISIT (OUTPATIENT)
Dept: ENDOCRINOLOGY | Age: 44
End: 2024-10-24
Payer: COMMERCIAL

## 2024-10-24 VITALS
SYSTOLIC BLOOD PRESSURE: 108 MMHG | BODY MASS INDEX: 37.78 KG/M2 | TEMPERATURE: 97.5 F | HEART RATE: 70 BPM | DIASTOLIC BLOOD PRESSURE: 80 MMHG | WEIGHT: 213.2 LBS | OXYGEN SATURATION: 99 %

## 2024-10-24 DIAGNOSIS — E04.1 THYROID NODULE: ICD-10-CM

## 2024-10-24 DIAGNOSIS — E06.3 HASHIMOTO'S THYROIDITIS: Primary | ICD-10-CM

## 2024-10-24 DIAGNOSIS — R73.03 PREDIABETES: ICD-10-CM

## 2024-10-24 DIAGNOSIS — E55.9 VITAMIN D DEFICIENCY: ICD-10-CM

## 2024-10-24 PROCEDURE — 99214 OFFICE O/P EST MOD 30 MIN: CPT | Performed by: STUDENT IN AN ORGANIZED HEALTH CARE EDUCATION/TRAINING PROGRAM

## 2024-10-24 NOTE — ASSESSMENT & PLAN NOTE
Takes vitamin D supplement 50,000 units weekly  Recheck vitamin D level is normal  Repeat vitamin D level before next visit

## 2024-10-24 NOTE — ASSESSMENT & PLAN NOTE
Recent TFT showed high TSH with normal free T4  Reports compliance with levothyroxine  Given history of intermittent palpitations, we will continue the current dose of levothyroxine 125 mcg daily and repeat labs in 4 weeks

## 2024-10-24 NOTE — PROGRESS NOTES
Chief Complaint  Hypothyroidism    Subjective        Keyonna Kumari presents to [facility] ENDOCRINOLOGY for follow up.      Hypothyroidism      Diagnosed with hypothyroidism in 2016 since then has been taking levothyroxine   Takes levothyroxine 125 mcg daily  Takes it early morning on empty stomach before breakfast     Has palpitation and follows with cardiology    Reports hot flashes.    Takes vitamin D 50,000 units weekly        History of thyroid nodule  Denies radiation exposure  Paternal aunt had thyroid cancer  Thyroid ultrasound April 2024  TI-RADS category 4 nodule in the inferior pole the right lobe of the thyroid gland measuring 0.8 x 0.6 x 0.4 cm. There is probably no significant interval change when allowing for differences in measuring technique. I would recommend a follow-up thyroid   ultrasound in 2 years which should confirm 5-year stability.       Had uterine ablation in 2018    Prediabetes  Recent hemoglobin A1c within the normal range  Not taking any medication  Watching her diet and doing regular exercise      Component      Latest Ref Rng 10/17/2024   Glucose      65 - 99 mg/dL 84    BUN      6 - 20 mg/dL 18    Creatinine      0.57 - 1.00 mg/dL 0.90    Sodium      136 - 145 mmol/L 141    Potassium      3.5 - 5.2 mmol/L 3.9    Chloride      98 - 107 mmol/L 102    CO2      22.0 - 29.0 mmol/L 29.5 (H)    Calcium      8.6 - 10.5 mg/dL 9.2    Total Protein      6.0 - 8.5 g/dL 6.8    Albumin      3.5 - 5.2 g/dL 4.2    ALT (SGPT)      1 - 33 U/L 13    AST (SGOT)      1 - 32 U/L 13    Alkaline Phosphatase      39 - 117 U/L 66    Total Bilirubin      0.0 - 1.2 mg/dL 0.4    Globulin      gm/dL 2.6    A/G Ratio      g/dL 1.6    BUN/Creatinine Ratio      7.0 - 25.0  20.0    Anion Gap      5.0 - 15.0 mmol/L 9.5    eGFR      >60.0 mL/min/1.73 82.0    25 Hydroxy, Vitamin D      30.0 - 100.0 ng/ml 47.3    TSH Baseline      0.270 - 4.200 uIU/mL 5.480 (H)    Free T4      0.92 - 1.68 ng/dL  "1.35    Hemoglobin A1C      4.80 - 5.60 % 5.60       Legend:  (H) High    Objective   Vital Signs:  /80   Pulse 70   Temp 97.5 °F (36.4 °C) (Oral)   Wt 96.7 kg (213 lb 3.2 oz)   SpO2 99%   BMI 37.78 kg/m²   Estimated body mass index is 37.78 kg/m² as calculated from the following:    Height as of 4/11/24: 160 cm (62.99\").    Weight as of this encounter: 96.7 kg (213 lb 3.2 oz).           Physical Exam  Constitutional:       Appearance: She is obese.   HENT:      Head: Normocephalic and atraumatic.   Eyes:      Extraocular Movements: Extraocular movements intact.   Cardiovascular:      Rate and Rhythm: Normal rate.   Pulmonary:      Effort: Pulmonary effort is normal.      Breath sounds: Normal breath sounds. No wheezing.   Abdominal:      Palpations: Abdomen is soft.      Tenderness: There is no abdominal tenderness.   Musculoskeletal:         General: No swelling.      Cervical back: No tenderness.   Skin:     General: Skin is dry.   Neurological:      Mental Status: She is alert and oriented to person, place, and time.      Comments: No tremor   Psychiatric:         Mood and Affect: Mood normal.        Result Review :  The following data was reviewed by: Mahrokh Nokhbehzaeim, MD on 10/24/2024:  CMP          3/23/2024    09:50 10/17/2024    11:28   CMP   Glucose 125  84    BUN 17  18    Creatinine 0.92  0.90    EGFR 79.9  82.0    Sodium 139  141    Potassium 3.8  3.9    Chloride 100  102    Calcium 9.2  9.2    Total Protein 7.0  6.8    Albumin 4.5  4.2    Globulin 2.5  2.6    Total Bilirubin 0.3  0.4    Alkaline Phosphatase 78  66    AST (SGOT) 15  13    ALT (SGPT) 14  13    Albumin/Globulin Ratio 1.8  1.6    BUN/Creatinine Ratio 18.5  20.0    Anion Gap 9.0  9.5      CMP          3/23/2024    09:50 10/17/2024    11:28   CMP   Glucose 125  84    BUN 17  18    Creatinine 0.92  0.90    EGFR 79.9  82.0    Sodium 139  141    Potassium 3.8  3.9    Chloride 100  102    Calcium 9.2  9.2    Total Protein 7.0  6.8 " "   Albumin 4.5  4.2    Globulin 2.5  2.6    Total Bilirubin 0.3  0.4    Alkaline Phosphatase 78  66    AST (SGOT) 15  13    ALT (SGPT) 14  13    Albumin/Globulin Ratio 1.8  1.6    BUN/Creatinine Ratio 18.5  20.0    Anion Gap 9.0  9.5       Most Recent A1C          10/17/2024    11:28   HGBA1C Most Recent   Hemoglobin A1C 5.60       TSH          3/23/2024    09:50 10/17/2024    11:28   TSH   TSH 2.450  5.480       Free T4   Date Value Ref Range Status   10/17/2024 1.35 0.92 - 1.68 ng/dL Final      25 Hydroxy, Vitamin D   Date Value Ref Range Status   10/17/2024 47.3 30.0 - 100.0 ng/ml Final      No results found for: \"THGAB\"   No results found for: \"THYROIDAB\"   Data reviewed : Radiologic studies Thyroid us             Assessment and Plan   Diagnoses and all orders for this visit:    1. Hashimoto's thyroiditis (Primary)  Assessment & Plan:  Recent TFT showed high TSH with normal free T4  Reports compliance with levothyroxine  Given history of intermittent palpitations, we will continue the current dose of levothyroxine 125 mcg daily and repeat labs in 4 weeks    Orders:  -     TSH; Future  -     T4, Free; Future    2. Vitamin D deficiency  Assessment & Plan:  Takes vitamin D supplement 50,000 units weekly  Recheck vitamin D level is normal  Repeat vitamin D level before next visit      3. Prediabetes  Assessment & Plan:  Watching her diet and losing weight  Continue diet and exercise        4. Thyroid nodule  Assessment & Plan:  Repeat thyroid ultrasound in April 2025               Follow Up   Return in about 4 months (around 2/24/2025).  Patient was given instructions and counseling regarding her condition or for health maintenance advice. Please see specific information pulled into the AVS if appropriate.       "

## 2025-01-27 ENCOUNTER — TELEPHONE (OUTPATIENT)
Dept: ENDOCRINOLOGY | Age: 45
End: 2025-01-27
Payer: COMMERCIAL

## 2025-02-03 ENCOUNTER — TELEPHONE (OUTPATIENT)
Dept: ENDOCRINOLOGY | Age: 45
End: 2025-02-03
Payer: COMMERCIAL

## 2025-02-19 ENCOUNTER — LAB (OUTPATIENT)
Dept: LAB | Facility: HOSPITAL | Age: 45
End: 2025-02-19
Payer: COMMERCIAL

## 2025-02-19 DIAGNOSIS — E06.3 HASHIMOTO'S THYROIDITIS: ICD-10-CM

## 2025-02-19 LAB
T4 FREE SERPL-MCNC: 1.25 NG/DL (ref 0.93–1.7)
TSH SERPL DL<=0.05 MIU/L-ACNC: 3.45 UIU/ML (ref 0.27–4.2)

## 2025-02-19 PROCEDURE — 36415 COLL VENOUS BLD VENIPUNCTURE: CPT

## 2025-02-19 PROCEDURE — 84439 ASSAY OF FREE THYROXINE: CPT

## 2025-02-19 PROCEDURE — 84443 ASSAY THYROID STIM HORMONE: CPT

## 2025-02-25 ENCOUNTER — OFFICE VISIT (OUTPATIENT)
Dept: ENDOCRINOLOGY | Age: 45
End: 2025-02-25
Payer: COMMERCIAL

## 2025-02-25 VITALS
TEMPERATURE: 97.9 F | SYSTOLIC BLOOD PRESSURE: 118 MMHG | WEIGHT: 219.8 LBS | BODY MASS INDEX: 38.95 KG/M2 | DIASTOLIC BLOOD PRESSURE: 80 MMHG | OXYGEN SATURATION: 99 % | HEART RATE: 82 BPM

## 2025-02-25 DIAGNOSIS — E06.3 HASHIMOTO'S THYROIDITIS: Primary | ICD-10-CM

## 2025-02-25 DIAGNOSIS — E04.1 THYROID NODULE: ICD-10-CM

## 2025-02-25 DIAGNOSIS — E55.9 VITAMIN D DEFICIENCY: ICD-10-CM

## 2025-02-25 DIAGNOSIS — R73.03 PREDIABETES: ICD-10-CM

## 2025-02-25 PROCEDURE — 99214 OFFICE O/P EST MOD 30 MIN: CPT | Performed by: STUDENT IN AN ORGANIZED HEALTH CARE EDUCATION/TRAINING PROGRAM

## 2025-02-25 NOTE — PROGRESS NOTES
"Chief Complaint  Hashimoto's Thyroiditis    Subjective        Keyonna Kumari presents to [facility] ENDOCR[facility] for follow up.     Hashimoto's Thyroiditis        Diagnosed with hypothyroidism in 2016 since then has been taking levothyroxine   Takes levothyroxine 125 mcg daily  Takes it early morning on empty stomach before breakfast  Does not take calcium or iron supplements     Has intermittent palpitation and follows with cardiology     Complains of joint pain, muscle aches and being lethargic  Reports cold intolerance     Takes vitamin D 50,000 units weekly        History of thyroid nodule  Denies radiation exposure  Paternal aunt had thyroid cancer  Thyroid ultrasound April 2024  TI-RADS category 4 nodule in the inferior pole the right lobe of the thyroid gland measuring 0.8 x 0.6 x 0.4 cm. There is probably no significant interval change when allowing for differences in measuring technique. I would recommend a follow-up thyroid   ultrasound in 2 years which should confirm 5-year stability.        Had uterine ablation in 2018     Prediabetes  Recent hemoglobin A1c within the normal range  Not taking any medication  Watching her diet and doing regular exercise      Component      Latest Ref Rng 10/17/2024 2/19/2025   25 Hydroxy, Vitamin D      30.0 - 100.0 ng/ml 47.3     TSH Baseline      0.270 - 4.200 uIU/mL 5.480 (H)  3.450    Free T4      0.93 - 1.70 ng/dL 1.35  1.25    Hemoglobin A1C      4.80 - 5.60 % 5.60        Legend:  (H) High    Objective   Vital Signs:  /80   Pulse 82   Temp 97.9 °F (36.6 °C)   Wt 99.7 kg (219 lb 12.8 oz)   SpO2 99%   BMI 38.95 kg/m²   Estimated body mass index is 38.95 kg/m² as calculated from the following:    Height as of 4/11/24: 160 cm (62.99\").    Weight as of this encounter: 99.7 kg (219 lb 12.8 oz).           Physical Exam  Constitutional:       Appearance: She is obese.   HENT:      Mouth/Throat:      Mouth: Mucous membranes are dry. "   Cardiovascular:      Rate and Rhythm: Normal rate.   Pulmonary:      Effort: Pulmonary effort is normal.      Breath sounds: Normal breath sounds. No wheezing.   Abdominal:      Palpations: Abdomen is soft.      Tenderness: There is no abdominal tenderness.   Musculoskeletal:         General: No swelling.      Cervical back: Neck supple. No tenderness.   Skin:     General: Skin is dry.   Neurological:      Mental Status: She is alert and oriented to person, place, and time.   Psychiatric:         Mood and Affect: Mood normal.        Result Review :  The following data was reviewed by: Mahrokh Nokhbehzaeim, MD on 02/25/2025:  CMP          3/23/2024    09:50 10/17/2024    11:28   CMP   Glucose 125  84    BUN 17  18    Creatinine 0.92  0.90    EGFR 79.9  82.0    Sodium 139  141    Potassium 3.8  3.9    Chloride 100  102    Calcium 9.2  9.2    Total Protein 7.0  6.8    Albumin 4.5  4.2    Globulin 2.5  2.6    Total Bilirubin 0.3  0.4    Alkaline Phosphatase 78  66    AST (SGOT) 15  13    ALT (SGPT) 14  13    Albumin/Globulin Ratio 1.8  1.6    BUN/Creatinine Ratio 18.5  20.0    Anion Gap 9.0  9.5      CMP          3/23/2024    09:50 10/17/2024    11:28   CMP   Glucose 125  84    BUN 17  18    Creatinine 0.92  0.90    EGFR 79.9  82.0    Sodium 139  141    Potassium 3.8  3.9    Chloride 100  102    Calcium 9.2  9.2    Total Protein 7.0  6.8    Albumin 4.5  4.2    Globulin 2.5  2.6    Total Bilirubin 0.3  0.4    Alkaline Phosphatase 78  66    AST (SGOT) 15  13    ALT (SGPT) 14  13    Albumin/Globulin Ratio 1.8  1.6    BUN/Creatinine Ratio 18.5  20.0    Anion Gap 9.0  9.5       Most Recent A1C          10/17/2024    11:28   HGBA1C Most Recent   Hemoglobin A1C 5.60       TSH          3/23/2024    09:50 10/17/2024    11:28 2/19/2025    17:09   TSH   TSH 2.450  5.480  3.450       Free T4   Date Value Ref Range Status   02/19/2025 1.25 0.93 - 1.70 ng/dL Final      25 Hydroxy, Vitamin D   Date Value Ref Range Status  "  10/17/2024 47.3 30.0 - 100.0 ng/ml Final      No results found for: \"THGAB\"   No results found for: \"THYROIDAB\"   Data reviewed : Radiologic studies Thyroid us              Assessment and Plan   Diagnoses and all orders for this visit:    1. Hashimoto's thyroiditis (Primary)  Assessment & Plan:  Recent TFT within the normal range  Current symptoms including joint pain, muscle aches and being lethargic very unlikely to be related to the thyroid  Given intermittent palpitations will not increase the dose of levothyroxine at this time as TFT is normal  Repeat TFT in 3 months    Orders:  -     TSH; Future  -     T4, Free; Future  -     Celiac Comprehensive Panel; Future    2. Vitamin D deficiency  Assessment & Plan:  Recheck TFT    Orders:  -     Vitamin D,25-Hydroxy; Future    3. Prediabetes  Assessment & Plan:  Continue low-carb diet  Recheck labs in 3 months    Orders:  -     Comprehensive Metabolic Panel; Future  -     Hemoglobin A1c; Future    4. Thyroid nodule  Assessment & Plan:  No compressive symptoms  Repeat thyroid ultrasound in April 2026      Stay well-hydrated         Follow Up   Return in about 6 months (around 8/25/2025).  Patient was given instructions and counseling regarding her condition or for health maintenance advice. Please see specific information pulled into the AVS if appropriate.       "

## 2025-02-25 NOTE — ASSESSMENT & PLAN NOTE
Recent TFT within the normal range  Current symptoms including joint pain, muscle aches and being lethargic very unlikely to be related to the thyroid  Given intermittent palpitations will not increase the dose of levothyroxine at this time as TFT is normal  Repeat TFT in 3 months

## 2025-03-09 ENCOUNTER — APPOINTMENT (OUTPATIENT)
Dept: CT IMAGING | Facility: HOSPITAL | Age: 45
End: 2025-03-09
Payer: COMMERCIAL

## 2025-03-09 ENCOUNTER — APPOINTMENT (OUTPATIENT)
Dept: GENERAL RADIOLOGY | Facility: HOSPITAL | Age: 45
End: 2025-03-09
Payer: COMMERCIAL

## 2025-03-09 ENCOUNTER — HOSPITAL ENCOUNTER (OUTPATIENT)
Facility: HOSPITAL | Age: 45
Setting detail: OBSERVATION
Discharge: HOME | End: 2025-03-10
Attending: EMERGENCY MEDICINE | Admitting: EMERGENCY MEDICINE
Payer: COMMERCIAL

## 2025-03-09 DIAGNOSIS — R42 DIZZINESS: Primary | ICD-10-CM

## 2025-03-09 DIAGNOSIS — R55 NEAR SYNCOPE: ICD-10-CM

## 2025-03-09 LAB
ALBUMIN SERPL-MCNC: 4.3 G/DL (ref 3.5–5.2)
ALBUMIN/GLOB SERPL: 2 G/DL
ALP SERPL-CCNC: 55 U/L (ref 39–117)
ALT SERPL W P-5'-P-CCNC: 14 U/L (ref 1–33)
ANION GAP SERPL CALCULATED.3IONS-SCNC: 15.8 MMOL/L (ref 5–15)
AST SERPL-CCNC: 20 U/L (ref 1–32)
B PARAPERT DNA SPEC QL NAA+PROBE: NOT DETECTED
B PERT DNA SPEC QL NAA+PROBE: NOT DETECTED
B-HCG UR QL: NEGATIVE
BASOPHILS # BLD AUTO: 0.02 10*3/MM3 (ref 0–0.2)
BASOPHILS NFR BLD AUTO: 0.3 % (ref 0–1.5)
BILIRUB SERPL-MCNC: 0.4 MG/DL (ref 0–1.2)
BILIRUB UR QL STRIP: NEGATIVE
BUN SERPL-MCNC: 15 MG/DL (ref 6–20)
BUN/CREAT SERPL: 15 (ref 7–25)
C PNEUM DNA NPH QL NAA+NON-PROBE: NOT DETECTED
CALCIUM SPEC-SCNC: 8.9 MG/DL (ref 8.6–10.5)
CHLORIDE SERPL-SCNC: 102 MMOL/L (ref 98–107)
CLARITY UR: CLEAR
CO2 SERPL-SCNC: 24.2 MMOL/L (ref 22–29)
COLOR UR: YELLOW
CREAT SERPL-MCNC: 1 MG/DL (ref 0.57–1)
DEPRECATED RDW RBC AUTO: 40.4 FL (ref 37–54)
EGFRCR SERPLBLD CKD-EPI 2021: 71.8 ML/MIN/1.73
EOSINOPHIL # BLD AUTO: 0.12 10*3/MM3 (ref 0–0.4)
EOSINOPHIL NFR BLD AUTO: 1.8 % (ref 0.3–6.2)
ERYTHROCYTE [DISTWIDTH] IN BLOOD BY AUTOMATED COUNT: 12.4 % (ref 12.3–15.4)
FLUAV SUBTYP SPEC NAA+PROBE: NOT DETECTED
FLUBV RNA ISLT QL NAA+PROBE: NOT DETECTED
GEN 5 1HR TROPONIN T REFLEX: <6 NG/L
GLOBULIN UR ELPH-MCNC: 2.1 GM/DL
GLUCOSE SERPL-MCNC: 108 MG/DL (ref 65–99)
GLUCOSE UR STRIP-MCNC: NEGATIVE MG/DL
HADV DNA SPEC NAA+PROBE: NOT DETECTED
HCOV 229E RNA SPEC QL NAA+PROBE: NOT DETECTED
HCOV HKU1 RNA SPEC QL NAA+PROBE: NOT DETECTED
HCOV NL63 RNA SPEC QL NAA+PROBE: NOT DETECTED
HCOV OC43 RNA SPEC QL NAA+PROBE: NOT DETECTED
HCT VFR BLD AUTO: 44.9 % (ref 34–46.6)
HGB BLD-MCNC: 14.8 G/DL (ref 12–15.9)
HGB UR QL STRIP.AUTO: NEGATIVE
HMPV RNA NPH QL NAA+NON-PROBE: NOT DETECTED
HOLD SPECIMEN: NORMAL
HOLD SPECIMEN: NORMAL
HPIV1 RNA ISLT QL NAA+PROBE: NOT DETECTED
HPIV2 RNA SPEC QL NAA+PROBE: NOT DETECTED
HPIV3 RNA NPH QL NAA+PROBE: NOT DETECTED
HPIV4 P GENE NPH QL NAA+PROBE: NOT DETECTED
IMM GRANULOCYTES # BLD AUTO: 0.01 10*3/MM3 (ref 0–0.05)
IMM GRANULOCYTES NFR BLD AUTO: 0.2 % (ref 0–0.5)
KETONES UR QL STRIP: NEGATIVE
LEUKOCYTE ESTERASE UR QL STRIP.AUTO: NEGATIVE
LYMPHOCYTES # BLD AUTO: 2.29 10*3/MM3 (ref 0.7–3.1)
LYMPHOCYTES NFR BLD AUTO: 35.2 % (ref 19.6–45.3)
M PNEUMO IGG SER IA-ACNC: NOT DETECTED
MAGNESIUM SERPL-MCNC: 2 MG/DL (ref 1.6–2.6)
MCH RBC QN AUTO: 29.6 PG (ref 26.6–33)
MCHC RBC AUTO-ENTMCNC: 33 G/DL (ref 31.5–35.7)
MCV RBC AUTO: 89.8 FL (ref 79–97)
MONOCYTES # BLD AUTO: 0.28 10*3/MM3 (ref 0.1–0.9)
MONOCYTES NFR BLD AUTO: 4.3 % (ref 5–12)
NEUTROPHILS NFR BLD AUTO: 3.78 10*3/MM3 (ref 1.7–7)
NEUTROPHILS NFR BLD AUTO: 58.2 % (ref 42.7–76)
NITRITE UR QL STRIP: NEGATIVE
NRBC BLD AUTO-RTO: 0 /100 WBC (ref 0–0.2)
PH UR STRIP.AUTO: 7 [PH] (ref 5–8)
PLATELET # BLD AUTO: 239 10*3/MM3 (ref 140–450)
PMV BLD AUTO: 10.2 FL (ref 6–12)
POTASSIUM SERPL-SCNC: 3.6 MMOL/L (ref 3.5–5.2)
PROT SERPL-MCNC: 6.4 G/DL (ref 6–8.5)
PROT UR QL STRIP: NEGATIVE
RBC # BLD AUTO: 5 10*6/MM3 (ref 3.77–5.28)
RHINOVIRUS RNA SPEC NAA+PROBE: NOT DETECTED
RSV RNA NPH QL NAA+NON-PROBE: NOT DETECTED
SARS-COV-2 RNA RESP QL NAA+PROBE: NOT DETECTED
SODIUM SERPL-SCNC: 142 MMOL/L (ref 136–145)
SP GR UR STRIP: 1.01 (ref 1–1.03)
TROPONIN T NUMERIC DELTA: NORMAL
TROPONIN T SERPL HS-MCNC: <6 NG/L
TSH SERPL DL<=0.05 MIU/L-ACNC: 2.2 UIU/ML (ref 0.27–4.2)
UROBILINOGEN UR QL STRIP: NORMAL
WBC NRBC COR # BLD AUTO: 6.5 10*3/MM3 (ref 3.4–10.8)
WHOLE BLOOD HOLD COAG: NORMAL
WHOLE BLOOD HOLD SPECIMEN: NORMAL

## 2025-03-09 PROCEDURE — 70450 CT HEAD/BRAIN W/O DYE: CPT

## 2025-03-09 PROCEDURE — G0378 HOSPITAL OBSERVATION PER HR: HCPCS

## 2025-03-09 PROCEDURE — 80050 GENERAL HEALTH PANEL: CPT | Performed by: NURSE PRACTITIONER

## 2025-03-09 PROCEDURE — 25010000002 ENOXAPARIN PER 10 MG: Performed by: NURSE PRACTITIONER

## 2025-03-09 PROCEDURE — 0202U NFCT DS 22 TRGT SARS-COV-2: CPT | Performed by: EMERGENCY MEDICINE

## 2025-03-09 PROCEDURE — 81003 URINALYSIS AUTO W/O SCOPE: CPT | Performed by: NURSE PRACTITIONER

## 2025-03-09 PROCEDURE — 71045 X-RAY EXAM CHEST 1 VIEW: CPT

## 2025-03-09 PROCEDURE — 36415 COLL VENOUS BLD VENIPUNCTURE: CPT

## 2025-03-09 PROCEDURE — 84484 ASSAY OF TROPONIN QUANT: CPT | Performed by: NURSE PRACTITIONER

## 2025-03-09 PROCEDURE — 96372 THER/PROPH/DIAG INJ SC/IM: CPT

## 2025-03-09 PROCEDURE — 99285 EMERGENCY DEPT VISIT HI MDM: CPT

## 2025-03-09 PROCEDURE — 96374 THER/PROPH/DIAG INJ IV PUSH: CPT

## 2025-03-09 PROCEDURE — 93005 ELECTROCARDIOGRAM TRACING: CPT

## 2025-03-09 PROCEDURE — 83735 ASSAY OF MAGNESIUM: CPT | Performed by: NURSE PRACTITIONER

## 2025-03-09 PROCEDURE — 93005 ELECTROCARDIOGRAM TRACING: CPT | Performed by: EMERGENCY MEDICINE

## 2025-03-09 PROCEDURE — 81025 URINE PREGNANCY TEST: CPT | Performed by: NURSE PRACTITIONER

## 2025-03-09 PROCEDURE — 25010000002 ONDANSETRON PER 1 MG: Performed by: NURSE PRACTITIONER

## 2025-03-09 RX ORDER — SODIUM CHLORIDE 0.9 % (FLUSH) 0.9 %
10 SYRINGE (ML) INJECTION AS NEEDED
Status: DISCONTINUED | OUTPATIENT
Start: 2025-03-09 | End: 2025-03-10

## 2025-03-09 RX ORDER — MECLIZINE HYDROCHLORIDE 25 MG/1
25 TABLET ORAL ONCE
Status: COMPLETED | OUTPATIENT
Start: 2025-03-09 | End: 2025-03-09

## 2025-03-09 RX ORDER — MELOXICAM 15 MG/1
15 TABLET ORAL DAILY PRN
COMMUNITY

## 2025-03-09 RX ORDER — ENOXAPARIN SODIUM 100 MG/ML
40 INJECTION SUBCUTANEOUS DAILY
Status: DISCONTINUED | OUTPATIENT
Start: 2025-03-09 | End: 2025-03-10 | Stop reason: HOSPADM

## 2025-03-09 RX ORDER — METHOCARBAMOL 750 MG/1
750 TABLET, FILM COATED ORAL 3 TIMES DAILY PRN
COMMUNITY

## 2025-03-09 RX ORDER — BISACODYL 10 MG
10 SUPPOSITORY, RECTAL RECTAL DAILY PRN
Status: DISCONTINUED | OUTPATIENT
Start: 2025-03-09 | End: 2025-03-10 | Stop reason: HOSPADM

## 2025-03-09 RX ORDER — SODIUM CHLORIDE 0.9 % (FLUSH) 0.9 %
10 SYRINGE (ML) INJECTION EVERY 12 HOURS SCHEDULED
Status: DISCONTINUED | OUTPATIENT
Start: 2025-03-09 | End: 2025-03-10 | Stop reason: HOSPADM

## 2025-03-09 RX ORDER — BISACODYL 5 MG/1
5 TABLET, DELAYED RELEASE ORAL DAILY PRN
Status: DISCONTINUED | OUTPATIENT
Start: 2025-03-09 | End: 2025-03-10 | Stop reason: HOSPADM

## 2025-03-09 RX ORDER — ONDANSETRON 2 MG/ML
4 INJECTION INTRAMUSCULAR; INTRAVENOUS EVERY 6 HOURS PRN
Status: DISCONTINUED | OUTPATIENT
Start: 2025-03-09 | End: 2025-03-10 | Stop reason: SDUPTHER

## 2025-03-09 RX ORDER — ONDANSETRON 2 MG/ML
4 INJECTION INTRAMUSCULAR; INTRAVENOUS ONCE
Status: COMPLETED | OUTPATIENT
Start: 2025-03-09 | End: 2025-03-09

## 2025-03-09 RX ORDER — SODIUM CHLORIDE 0.9 % (FLUSH) 0.9 %
10 SYRINGE (ML) INJECTION AS NEEDED
Status: DISCONTINUED | OUTPATIENT
Start: 2025-03-09 | End: 2025-03-10 | Stop reason: HOSPADM

## 2025-03-09 RX ORDER — SODIUM CHLORIDE 9 MG/ML
40 INJECTION, SOLUTION INTRAVENOUS AS NEEDED
Status: DISCONTINUED | OUTPATIENT
Start: 2025-03-09 | End: 2025-03-10 | Stop reason: HOSPADM

## 2025-03-09 RX ORDER — AMOXICILLIN 250 MG
2 CAPSULE ORAL 2 TIMES DAILY PRN
Status: DISCONTINUED | OUTPATIENT
Start: 2025-03-09 | End: 2025-03-10 | Stop reason: HOSPADM

## 2025-03-09 RX ORDER — POLYETHYLENE GLYCOL 3350 17 G/17G
17 POWDER, FOR SOLUTION ORAL DAILY PRN
Status: DISCONTINUED | OUTPATIENT
Start: 2025-03-09 | End: 2025-03-10 | Stop reason: HOSPADM

## 2025-03-09 RX ADMIN — Medication 5 MG: at 23:18

## 2025-03-09 RX ADMIN — Medication 10 ML: at 23:18

## 2025-03-09 RX ADMIN — MECLIZINE HYDROCHLORIDE 25 MG: 25 TABLET ORAL at 17:14

## 2025-03-09 RX ADMIN — ONDANSETRON 4 MG: 2 INJECTION INTRAMUSCULAR; INTRAVENOUS at 14:58

## 2025-03-09 RX ADMIN — ENOXAPARIN SODIUM 40 MG: 100 INJECTION SUBCUTANEOUS at 17:14

## 2025-03-09 NOTE — ED NOTES
"Patient reports sitting outside with her dog and then the right side of her brain \"feeling funny, kind of twitching.\" Doesn't really remember losing consciousness.   "

## 2025-03-09 NOTE — ED PROVIDER NOTES
"Subjective   History of Present Illness  Chief complaint: Dizziness near syncope      Context: 45-year-old female past medical history significant for Hashimoto's presents after she had a near syncopal episode while she was sitting down on the porch talking to her dog and states \"the left side of my brain felt twitchy and I got anxious with a fast HR.\" no associated chest pain or shortness of breath.  No recent illness.  No new medications or dose adjustments.  No swelling to her legs or feet.  No history of migraines or seizures.  Neurologically intact at this time.  No recent URI symptoms        PCP: caro      Review of Systems   Constitutional:  Negative for fever.       Past Medical History:   Diagnosis Date    Gestational diabetes     Hashimoto's disease     Hashimoto's disease     Hirsutism     Hyperlipidemia     Hypothyroidism     Polycystic ovary syndrome     Thyroid nodule     Vitamin D deficiency        No Known Allergies    Past Surgical History:   Procedure Laterality Date     SECTION      X 3     GALLBLADDER SURGERY      LASER ABLATION      OVARY SURGERY      X 6        Family History   Problem Relation Age of Onset    Hypertension Mother             Heart failure Mother     COPD Mother     Diabetes Father     Thyroid disease Sister     Diabetes Paternal Grandfather             Heart disease Paternal Grandfather     Prostate cancer Paternal Grandfather        Social History     Socioeconomic History    Marital status:    Tobacco Use    Smoking status: Never    Smokeless tobacco: Never   Vaping Use    Vaping status: Never Used   Substance and Sexual Activity    Alcohol use: No    Drug use: Never    Sexual activity: Yes     Partners: Male     Birth control/protection: Tubal ligation           Objective   Physical Exam    Vital signs in triage nurse note reviewed.  Constitutional: Awake, alert, well developed and well nourished. No acute distress is noted.  Daughter at " bedside  HEENT: Normocephalic, atraumatic; pupils are PERRL with intact EOM; oropharynx is pink and moist without exudate or erythema.  Neck: Supple, full range of motion without pain; no cervical lymphadenopathy.  Cardiovascular: Regular rate and rhythm, normal S1-S2.    Pulmonary: Respiratory effort regular, nonlabored; breath sounds clear to auscultation all fields.  Abdomen: Soft, nontender, nondistended with normoactive bowel sounds; no rebound or guarding.  Musculoskeletal: Independent range of motion of all extremities, no palpable tenderness or edema. Spine is midline without obvious curvature scoliosis. No bony tenderness, soft tissue swelling, deformity is noted. Paraspinal musculature is soft, nontender.  Neuro: Alert oriented x3, speech is clear and appropriate.  Normal shoulder shrug, equal palate rise, no peripheral vision loss, no facial asymmetry,no pronator drift, normal coordination.      Procedures           ED Course      Labs Reviewed   COMPREHENSIVE METABOLIC PANEL - Abnormal; Notable for the following components:       Result Value    Glucose 108 (*)     Anion Gap 15.8 (*)     All other components within normal limits    Narrative:     GFR Categories in Chronic Kidney Disease (CKD)      GFR Category          GFR (mL/min/1.73)    Interpretation  G1                     90 or greater         Normal or high (1)  G2                      60-89                Mild decrease (1)  G3a                   45-59                Mild to moderate decrease  G3b                   30-44                Moderate to severe decrease  G4                    15-29                Severe decrease  G5                    14 or less           Kidney failure          (1)In the absence of evidence of kidney disease, neither GFR category G1 or G2 fulfill the criteria for CKD.    eGFR calculation 2021 CKD-EPI creatinine equation, which does not include race as a factor   CBC WITH AUTO DIFFERENTIAL - Abnormal; Notable for the  following components:    Monocyte % 4.3 (*)     All other components within normal limits   URINALYSIS W/ MICROSCOPIC IF INDICATED (NO CULTURE) - Normal    Narrative:     Urine microscopic not indicated.   PREGNANCY, URINE - Normal   TROPONIN - Normal    Narrative:     High Sensitive Troponin T Reference Range:  <14.0 ng/L- Negative Female for AMI  <22.0 ng/L- Negative Male for AMI  >=14 - Abnormal Female indicating possible myocardial injury.  >=22 - Abnormal Male indicating possible myocardial injury.   Clinicians would have to utilize clinical acumen, EKG, Troponin, and serial changes to determine if it is an Acute Myocardial Infarction or myocardial injury due to an underlying chronic condition.        TSH RFX ON ABNORMAL TO FREE T4 - Normal   MAGNESIUM - Normal   RESPIRATORY PANEL PCR W/ COVID-19 (SARS-COV-2), NP SWAB IN UTM/VTP, 2 HR TAT   RAINBOW DRAW    Narrative:     The following orders were created for panel order [place] Draw.  Procedure                               Abnormality         Status                     ---------                               -----------         ------                     Green Top (Gel)[[id number]]                                  Final result               Lavender Top[[id number]]                                     Final result               Gold Top - SST[[id number]]                                   Final result               Light Blue Top[[id number]]                                   Final result                 Please view results for these tests on the individual orders.   HIGH SENSITIVITIY TROPONIN T 1HR   GREEN TOP   LAVENDER TOP   GOLD TOP - SST   LIGHT BLUE TOP   CBC AND DIFFERENTIAL    Narrative:     The following orders were created for panel order CBC & Differential.  Procedure                               Abnormality         Status                     ---------                               -----------         ------                     CBC Auto  Differential[[id number]]        Abnormal            Final result                 Please view results for these tests on the individual orders.     Medications   sodium chloride 0.9 % flush 10 mL (has no administration in time range)   meclizine (ANTIVERT) tablet 25 mg (has no administration in time range)   sodium chloride 0.9 % flush 10 mL (has no administration in time range)   sodium chloride 0.9 % flush 10 mL (has no administration in time range)   sodium chloride 0.9 % infusion 40 mL (has no administration in time range)   ondansetron (ZOFRAN) injection 4 mg (has no administration in time range)   melatonin tablet 5 mg (has no administration in time range)   enoxaparin sodium (LOVENOX) syringe 40 mg (has no administration in time range)   sennosides-docusate (PERICOLACE) 8.6-50 MG per tablet 2 tablet (has no administration in time range)     And   polyethylene glycol (MIRALAX) packet 17 g (has no administration in time range)     And   bisacodyl (DULCOLAX) EC tablet 5 mg (has no administration in time range)     And   bisacodyl (DULCOLAX) suppository 10 mg (has no administration in time range)   ondansetron (ZOFRAN) injection 4 mg (4 mg Intravenous Given 3/9/25 1458)     CT Head Without Contrast  Result Date: 3/9/2025  Impression: No acute intracranial abnormality. Electronically Signed: Abrahan Fortune MD  3/9/2025 2:20 PM EDT  Workstation ID: [id number]    XR Chest 1 View  Result Date: 3/9/2025  Impression: No acute cardiopulmonary abnormality is identified. Electronically Signed: Klarissa Styles  3/9/2025 2:20 PM EDT  Workstation ID: [id number]    Prior to Admission medications    Medication Sig Start Date End Date Taking? Authorizing Provider   cephalexin (KEFLEX) 500 MG capsule Take 1 capsule by mouth Every 12 (Twelve) Hours. 6/13/24      cyclobenzaprine (FLEXERIL) 10 MG tablet Take 1 tablet by mouth 3 (Three) Times a Day.    Provider, MD Gage   Diclofenac Sodium (VOLTAREN) 1 % gel gel APPLY 2 GRAMS TO THE  "AFFECTED AREA(S) BY TOPICAL ROUTE 4 TIMES PER DAY 6/6/24      hydroCHLOROthiazide 50 MG tablet Take 1 tablet by mouth Daily. 6/6/24      levothyroxine (SYNTHROID, LEVOTHROID) 125 MCG tablet Take 1 tablet by mouth Daily. 2/28/24      meloxicam (MOBIC) 15 MG tablet Take 1 tablet by mouth every day for musculoskeletal pain. 6/6/24      methocarbamol (ROBAXIN) 750 MG tablet Take 1 tablet by mouth 3 times a day for muscle spasm. 6/6/24      vitamin B-12 (CYANOCOBALAMIN) 1000 MCG tablet Take 1 tablet by mouth Daily. 6/19/24      vitamin D (ERGOCALCIFEROL) 1.25 MG (31033 UT) capsule capsule Take 1 capsule by mouth 1 (One) Time Per Week. 6/19/24                                                         Medical Decision Making      /96   Pulse 73   Temp 98.7 °F (37.1 °C) (Oral)   Resp 20   Ht 160 cm (63\")   Wt 100 kg (220 lb 10.9 oz)   SpO2 98%   BMI 39.09 kg/m²         Radiology interpretation: CT head and chest x-rays reviewed and interpreted by Zacarias: Negative for ICH or focal infiltrates  Further interpretation by radiologist as above  Lab interpretation:  Labs all viewed by me and significant for, negative UA and pregnancy test, glucose 108 negative troponin TSH 2.2 mag 2.0 white count 6.5    EKG viewed and interpreted by Dr. Adames, sinus rhythm rate of 74 QTc 457  comparison: None            Appropriate PPE worn during exam.  Patient was placed on cardiac monitor had an IV established labs CT chest x-ray obtained evaluate for electrode abnormalities dehydration infection pneumonia.  She has no anginal symptoms, PERC negative.  There was no oropharyngeal trauma or incontinence prior history of seizures or migraines.  She states 2 days ago she did have a generalized headache that was not the worst headache of her life without any thunderclap type noise.  She has not had any headaches since then.  No stiff neck fever or systemic illness.  Unremarkable orthostatic vitals although it did reproduce her " "dizziness. she states other than being \"foggy\" when she arrived she felt okay until she laid down to get on the CT scanner and became dizzy like the room was spinning.  No history of vertigo.  She was given meclizine.  She is somewhat anxious about her symptoms.  No history of demyelinating disorders saddle anesthesia bowel or bladder incontinence retention or extremity weakness.  She was placed in observation for PT vestibular evaluation and neurology consultation.  i discussed findings with patient who voices understanding of obs placement    Discussed with dr brandon    Problems Addressed:  Dizziness: complicated acute illness or injury  Near syncope: complicated acute illness or injury    Amount and/or Complexity of Data Reviewed  Labs: ordered.  Radiology: ordered.    Risk  OTC drugs.  Prescription drug management.  Decision regarding hospitalization.        Final diagnoses:   Dizziness   Near syncope       ED Disposition  ED Disposition       ED Disposition   Decision to Admit    Condition   --    Comment   --               No follow-up provider specified.       Medication List      No changes were made to your prescriptions during this visit.            Barb Ayala, APRN  03/09/25 1534    "

## 2025-03-10 ENCOUNTER — APPOINTMENT (OUTPATIENT)
Dept: MRI IMAGING | Facility: HOSPITAL | Age: 45
End: 2025-03-10
Payer: COMMERCIAL

## 2025-03-10 ENCOUNTER — APPOINTMENT (OUTPATIENT)
Dept: CT IMAGING | Facility: HOSPITAL | Age: 45
End: 2025-03-10
Payer: COMMERCIAL

## 2025-03-10 VITALS
OXYGEN SATURATION: 97 % | RESPIRATION RATE: 19 BRPM | TEMPERATURE: 98.2 F | WEIGHT: 217.59 LBS | DIASTOLIC BLOOD PRESSURE: 76 MMHG | BODY MASS INDEX: 38.55 KG/M2 | SYSTOLIC BLOOD PRESSURE: 115 MMHG | HEART RATE: 77 BPM | HEIGHT: 63 IN

## 2025-03-10 LAB
ANION GAP SERPL CALCULATED.3IONS-SCNC: 10.4 MMOL/L (ref 5–15)
BASOPHILS # BLD AUTO: 0.01 10*3/MM3 (ref 0–0.2)
BASOPHILS NFR BLD AUTO: 0.2 % (ref 0–1.5)
BUN SERPL-MCNC: 15 MG/DL (ref 6–20)
BUN/CREAT SERPL: 18.5 (ref 7–25)
CALCIUM SPEC-SCNC: 8.8 MG/DL (ref 8.6–10.5)
CHLORIDE SERPL-SCNC: 105 MMOL/L (ref 98–107)
CHOLEST SERPL-MCNC: 211 MG/DL (ref 0–200)
CO2 SERPL-SCNC: 27.6 MMOL/L (ref 22–29)
CREAT SERPL-MCNC: 0.81 MG/DL (ref 0.57–1)
CRP SERPL-MCNC: <0.3 MG/DL (ref 0–0.5)
DEPRECATED RDW RBC AUTO: 41.6 FL (ref 37–54)
EGFRCR SERPLBLD CKD-EPI 2021: 92.5 ML/MIN/1.73
EOSINOPHIL # BLD AUTO: 0.14 10*3/MM3 (ref 0–0.4)
EOSINOPHIL NFR BLD AUTO: 2.3 % (ref 0.3–6.2)
ERYTHROCYTE [DISTWIDTH] IN BLOOD BY AUTOMATED COUNT: 12.7 % (ref 12.3–15.4)
ERYTHROCYTE [SEDIMENTATION RATE] IN BLOOD: 6 MM/HR (ref 0–20)
GLUCOSE SERPL-MCNC: 98 MG/DL (ref 65–99)
HBA1C MFR BLD: 5.8 % (ref 4.8–5.6)
HCT VFR BLD AUTO: 41.5 % (ref 34–46.6)
HDLC SERPL-MCNC: 61 MG/DL (ref 40–60)
HGB BLD-MCNC: 13.3 G/DL (ref 12–15.9)
IMM GRANULOCYTES # BLD AUTO: 0.01 10*3/MM3 (ref 0–0.05)
IMM GRANULOCYTES NFR BLD AUTO: 0.2 % (ref 0–0.5)
LDLC SERPL CALC-MCNC: 130 MG/DL (ref 0–100)
LDLC/HDLC SERPL: 2.09 {RATIO}
LYMPHOCYTES # BLD AUTO: 3.18 10*3/MM3 (ref 0.7–3.1)
LYMPHOCYTES NFR BLD AUTO: 53.2 % (ref 19.6–45.3)
MCH RBC QN AUTO: 29 PG (ref 26.6–33)
MCHC RBC AUTO-ENTMCNC: 32 G/DL (ref 31.5–35.7)
MCV RBC AUTO: 90.4 FL (ref 79–97)
MONOCYTES # BLD AUTO: 0.3 10*3/MM3 (ref 0.1–0.9)
MONOCYTES NFR BLD AUTO: 5 % (ref 5–12)
NEUTROPHILS NFR BLD AUTO: 2.34 10*3/MM3 (ref 1.7–7)
NEUTROPHILS NFR BLD AUTO: 39.1 % (ref 42.7–76)
NRBC BLD AUTO-RTO: 0 /100 WBC (ref 0–0.2)
NT-PROBNP SERPL-MCNC: 97.1 PG/ML (ref 0–450)
PLATELET # BLD AUTO: 212 10*3/MM3 (ref 140–450)
PMV BLD AUTO: 10.2 FL (ref 6–12)
POTASSIUM SERPL-SCNC: 3.6 MMOL/L (ref 3.5–5.2)
QT INTERVAL: 434 MS
QTC INTERVAL: 431 MS
RBC # BLD AUTO: 4.59 10*6/MM3 (ref 3.77–5.28)
SODIUM SERPL-SCNC: 143 MMOL/L (ref 136–145)
T3FREE SERPL-MCNC: 2.49 PG/ML (ref 2–4.4)
T4 FREE SERPL-MCNC: 1.28 NG/DL (ref 0.93–1.7)
TRIGL SERPL-MCNC: 113 MG/DL (ref 0–150)
TSH SERPL DL<=0.05 MIU/L-ACNC: 4.31 UIU/ML (ref 0.27–4.2)
VLDLC SERPL-MCNC: 20 MG/DL (ref 5–40)
WBC NRBC COR # BLD AUTO: 5.98 10*3/MM3 (ref 3.4–10.8)

## 2025-03-10 PROCEDURE — 85652 RBC SED RATE AUTOMATED: CPT | Performed by: NURSE PRACTITIONER

## 2025-03-10 PROCEDURE — 84481 FREE ASSAY (FT-3): CPT | Performed by: NURSE PRACTITIONER

## 2025-03-10 PROCEDURE — 84443 ASSAY THYROID STIM HORMONE: CPT | Performed by: NURSE PRACTITIONER

## 2025-03-10 PROCEDURE — 70551 MRI BRAIN STEM W/O DYE: CPT

## 2025-03-10 PROCEDURE — 85025 COMPLETE CBC W/AUTO DIFF WBC: CPT | Performed by: NURSE PRACTITIONER

## 2025-03-10 PROCEDURE — 84439 ASSAY OF FREE THYROXINE: CPT | Performed by: NURSE PRACTITIONER

## 2025-03-10 PROCEDURE — 83036 HEMOGLOBIN GLYCOSYLATED A1C: CPT | Performed by: NURSE PRACTITIONER

## 2025-03-10 PROCEDURE — 83880 ASSAY OF NATRIURETIC PEPTIDE: CPT | Performed by: NURSE PRACTITIONER

## 2025-03-10 PROCEDURE — 80048 BASIC METABOLIC PNL TOTAL CA: CPT | Performed by: NURSE PRACTITIONER

## 2025-03-10 PROCEDURE — G0378 HOSPITAL OBSERVATION PER HR: HCPCS

## 2025-03-10 PROCEDURE — 86140 C-REACTIVE PROTEIN: CPT | Performed by: NURSE PRACTITIONER

## 2025-03-10 PROCEDURE — 93005 ELECTROCARDIOGRAM TRACING: CPT | Performed by: EMERGENCY MEDICINE

## 2025-03-10 PROCEDURE — 70496 CT ANGIOGRAPHY HEAD: CPT

## 2025-03-10 PROCEDURE — 80061 LIPID PANEL: CPT | Performed by: NURSE PRACTITIONER

## 2025-03-10 PROCEDURE — 99214 OFFICE O/P EST MOD 30 MIN: CPT | Performed by: NURSE PRACTITIONER

## 2025-03-10 PROCEDURE — 70498 CT ANGIOGRAPHY NECK: CPT

## 2025-03-10 PROCEDURE — 25510000001 IOPAMIDOL PER 1 ML: Performed by: EMERGENCY MEDICINE

## 2025-03-10 PROCEDURE — 97162 PT EVAL MOD COMPLEX 30 MIN: CPT | Performed by: PHYSICAL THERAPIST

## 2025-03-10 PROCEDURE — 95992 CANALITH REPOSITIONING PROC: CPT | Performed by: PHYSICAL THERAPIST

## 2025-03-10 RX ORDER — SODIUM CHLORIDE 9 MG/ML
40 INJECTION, SOLUTION INTRAVENOUS AS NEEDED
Status: DISCONTINUED | OUTPATIENT
Start: 2025-03-10 | End: 2025-03-10 | Stop reason: HOSPADM

## 2025-03-10 RX ORDER — ACETAMINOPHEN 325 MG/1
650 TABLET ORAL EVERY 4 HOURS PRN
Status: DISCONTINUED | OUTPATIENT
Start: 2025-03-10 | End: 2025-03-10 | Stop reason: HOSPADM

## 2025-03-10 RX ORDER — LEVOTHYROXINE SODIUM 125 UG/1
125 TABLET ORAL
Status: DISCONTINUED | OUTPATIENT
Start: 2025-03-10 | End: 2025-03-10 | Stop reason: HOSPADM

## 2025-03-10 RX ORDER — ONDANSETRON 4 MG/1
4 TABLET, ORALLY DISINTEGRATING ORAL EVERY 6 HOURS PRN
Status: DISCONTINUED | OUTPATIENT
Start: 2025-03-10 | End: 2025-03-10 | Stop reason: HOSPADM

## 2025-03-10 RX ORDER — ACETAMINOPHEN 650 MG/1
650 SUPPOSITORY RECTAL EVERY 4 HOURS PRN
Status: DISCONTINUED | OUTPATIENT
Start: 2025-03-10 | End: 2025-03-10 | Stop reason: HOSPADM

## 2025-03-10 RX ORDER — ACETAMINOPHEN 160 MG/5ML
650 SOLUTION ORAL EVERY 4 HOURS PRN
Status: DISCONTINUED | OUTPATIENT
Start: 2025-03-10 | End: 2025-03-10 | Stop reason: HOSPADM

## 2025-03-10 RX ORDER — SODIUM CHLORIDE 0.9 % (FLUSH) 0.9 %
10 SYRINGE (ML) INJECTION AS NEEDED
Status: DISCONTINUED | OUTPATIENT
Start: 2025-03-10 | End: 2025-03-10 | Stop reason: HOSPADM

## 2025-03-10 RX ORDER — NITROGLYCERIN 0.4 MG/1
0.4 TABLET SUBLINGUAL
Status: DISCONTINUED | OUTPATIENT
Start: 2025-03-10 | End: 2025-03-10 | Stop reason: HOSPADM

## 2025-03-10 RX ORDER — IOPAMIDOL 755 MG/ML
100 INJECTION, SOLUTION INTRAVASCULAR
Status: COMPLETED | OUTPATIENT
Start: 2025-03-10 | End: 2025-03-10

## 2025-03-10 RX ORDER — ONDANSETRON 4 MG/1
4 TABLET, FILM COATED ORAL EVERY 8 HOURS PRN
Qty: 30 TABLET | Refills: 0 | Status: SHIPPED | OUTPATIENT
Start: 2025-03-10

## 2025-03-10 RX ORDER — ONDANSETRON 2 MG/ML
4 INJECTION INTRAMUSCULAR; INTRAVENOUS EVERY 6 HOURS PRN
Status: DISCONTINUED | OUTPATIENT
Start: 2025-03-10 | End: 2025-03-10 | Stop reason: HOSPADM

## 2025-03-10 RX ORDER — SCOPOLAMINE 1 MG/3D
1 PATCH, EXTENDED RELEASE TRANSDERMAL
Qty: 30 EACH | Refills: 0 | Status: SHIPPED | OUTPATIENT
Start: 2025-03-10

## 2025-03-10 RX ORDER — HYDROCHLOROTHIAZIDE 25 MG/1
50 TABLET ORAL DAILY
Status: DISCONTINUED | OUTPATIENT
Start: 2025-03-10 | End: 2025-03-10 | Stop reason: HOSPADM

## 2025-03-10 RX ORDER — MECLIZINE HCL 12.5 MG 12.5 MG/1
12.5 TABLET ORAL 3 TIMES DAILY PRN
Qty: 30 TABLET | Refills: 0 | Status: SHIPPED | OUTPATIENT
Start: 2025-03-10

## 2025-03-10 RX ORDER — SODIUM CHLORIDE 0.9 % (FLUSH) 0.9 %
10 SYRINGE (ML) INJECTION EVERY 12 HOURS SCHEDULED
Status: DISCONTINUED | OUTPATIENT
Start: 2025-03-10 | End: 2025-03-10 | Stop reason: HOSPADM

## 2025-03-10 RX ORDER — ATORVASTATIN CALCIUM 40 MG/1
40 TABLET, FILM COATED ORAL NIGHTLY
Status: DISCONTINUED | OUTPATIENT
Start: 2025-03-10 | End: 2025-03-10 | Stop reason: HOSPADM

## 2025-03-10 RX ADMIN — IOPAMIDOL 100 ML: 755 INJECTION, SOLUTION INTRAVENOUS at 12:07

## 2025-03-10 NOTE — PLAN OF CARE
"Goal Outcome Evaluation:  Plan of Care Reviewed With: patient        Progress: no change  Outcome Evaluation: 45-year-old female past medical history significant for Hashimoto's presents after she had a near syncopal episode while she was sitting down on the porch talking to her dog and states \"the left side of my brain felt twitchy and I got anxious with a fast HR. CT head and chest negative for acute abnormality. Awaiting neuro consult today. PLOF is (I) with ADLs, community mobility, and work activities. Lives with  and children in 0STE home. Bed mobilty and ambulation independent, dynamic balance CGA, and vestibular testing shows positive head impulse test as well as positive left posterior canalithiasis BPPV. She became increasingly dizziness with room spinning dizziness upon last postion of Epley for left posterior canal which lasted under 1 minute. Recommend home with OPPT vestibular therapy f/u at discharge.    Anticipated Discharge Disposition (PT): home, home with outpatient therapy services                        "

## 2025-03-10 NOTE — PLAN OF CARE
Goal Outcome Evaluation:    Awaiting neurology consult for further recommendations. PT evaluation pending

## 2025-03-10 NOTE — THERAPY EVALUATION
Patient Name: Keyonna Kumari  : 1980    MRN: [id number]                              Today's Date: 3/10/2025       Admit Date: 3/9/2025    Visit Dx:     ICD-10-CM ICD-9-CM   1. Dizziness  R42 780.4   2. 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    Vitamin D deficiency    Prediabetes    Hashimoto's thyroiditis    Dizziness     Past Medical History:   Diagnosis Date    Gestational diabetes     Hashimoto's disease     Hashimoto's disease     Hirsutism     Hyperlipidemia     Hypothyroidism     Polycystic ovary syndrome     Thyroid nodule     Vitamin D deficiency      Past Surgical History:   Procedure Laterality Date     SECTION      X 3     GALLBLADDER SURGERY      LASER ABLATION      OVARY SURGERY      X 6       General Information       Row Name 03/10/25 1101          Physical Therapy Time and Intention    Document Type evaluation  -AD     Mode of Treatment physical therapy  -AD       Row Name 03/10/25 1101          General Information    Patient Profile Reviewed yes  -AD     Prior Level of Function independent:;home management;work;community mobility  -AD     Existing Precautions/Restrictions no known precautions/restrictions  -AD     Barriers to Rehab none identified  -AD       Row Name 03/10/25 1101          Living Environment    Current Living Arrangements home  -AD     People in Home spouse;child(kourtney), dependent  -AD       Row Name 03/10/25 1101          Safety Issues/Impairments Affecting Functional Mobility    Impairments Affecting Function (Mobility) visual/perceptual;balance  -AD               User Key  (r) = Recorded By, (t) = Taken By, (c) = Cosigned By      Initials Name Provider Type    AD Nathan  Ruth, PT Physical Therapist                   Mobility       Row Name 03/10/25 1102          Bed Mobility    Bed Mobility bed mobility (all) activities  -AD     All Activities, [place] (Bed Mobility) independent  -AD       Row Name 03/10/25 1102          Sit-Stand Transfer    Sit-Stand [place] (Transfers) independent  -AD       Row Name 03/10/25 1102          Gait/Stairs (Locomotion)    [place] Level (Gait) independent  -AD     Patient was able to Ambulate yes  -AD     Distance in Feet (Gait) 30  -AD               User Key  (r) = Recorded By, (t) = Taken By, (c) = Cosigned By      Initials Name Provider Type    AD Ruth Evans, PT Physical Therapist                   Obj/Interventions       Row Name 03/10/25 1102          Range of Motion Comprehensive    General Range of Motion no range of motion deficits identified  -AD       Row Name 03/10/25 1102          Strength Comprehensive (MMT)    General Manual Muscle Testing (MMT) Assessment no strength deficits identified  -AD       Row Name 03/10/25 1102          Balance    Balance Assessment standing dynamic balance  -AD     Dynamic Standing Balance contact guard  -AD               User Key  (r) = Recorded By, (t) = Taken By, (c) = Cosigned By      Initials Name Provider Type    Ruth Hines, PT Physical Therapist                   Goals/Plan       Row Name 03/10/25 1107          Bed Mobility Goal 1 (PT)    Activity/Assistive Device (Bed Mobility Goal 1, PT) bed mobility activities, all  -AD     [place] Level/Cues Needed (Bed Mobility Goal 1, PT) independent  -AD     Time Frame (Bed Mobility Goal 1, PT) long term goal (LTG)  -AD       Row Name 03/10/25 1107          Transfer Goal 1 (PT)    Activity/Assistive Device (Transfer Goal 1, PT) transfers, all  -AD     [place] Level/Cues Needed (Transfer Goal 1, PT) independent  -AD     Time Frame (Transfer Goal 1, PT) long term goal (LTG)  -AD       Row Name 03/10/25 1107           "Gait Training Goal 1 (PT)    Activity/Assistive Device (Gait Training Goal 1, PT) gait (walking locomotion)  -AD     [place] Level (Gait Training Goal 1, PT) independent  -AD     Distance (Gait Training Goal 1, PT) 150 no dizziness  -AD     Time Frame (Gait Training Goal 1, PT) long term goal (LTG)  -AD       Row Name 03/10/25 1107          Balance Goal 1 (PT)    Activity/Assistive Device (Balance Goal) standing dynamic balance;with functional reaching activities;with functional mobility activities;with functional activities/occupations  -AD     [place] Level/Cues Needed (Balance Goal 1, PT) independent  -AD     Time Frame (Balance Goal 1, PT) long-term goal (LTG)  -AD       Row Name 03/10/25 1107          Problem Specific Goal 1 (PT)    Problem Specific Goal 1 (PT) no dizziness with vestibular testing or BPPV testing  -AD     Time Frame (Problem Specific Goal 1, PT) long-term goal (LTG)  -AD       Row Name 03/10/25 1107          Therapy Assessment/Plan (PT)    Planned Therapy Interventions (PT) balance training;gait training;patient/family education;vestibular therapy;transfer training;postural re-education;ROM (range of motion);strengthening;neuromuscular re-education;motor coordination training  -AD               User Key  (r) = Recorded By, (t) = Taken By, (c) = Cosigned By      Initials Name Provider Type    AD Ruth Evans, PT Physical Therapist                   Clinical Impression       Row Name 03/10/25 1102          Pain    Pretreatment Pain Rating 0/10 - no pain  -AD     Posttreatment Pain Rating 0/10 - no pain  -AD       Row Name 03/10/25 1102          Plan of Care Review    Plan of Care Reviewed With patient  -AD     Progress no change  -AD     Outcome Evaluation 45-year-old female past medical history significant for Hashimoto's presents after she had a near syncopal episode while she was sitting down on the porch talking to her dog and states \"the left side of my brain felt twitchy and " I got anxious with a fast HR. CT head and chest negative for acute abnormality. Awaiting neuro consult today. PLOF is (I) with ADLs, community mobility, and work activities. Lives with  and children in 0STE home. Bed mobilty and ambulation independent, dynamic balance CGA, and vestibular testing shows positive head impulse test as well as positive left posterior canalithiasis BPPV. She became increasingly dizziness with room spinning dizziness upon last postion of Epley for left posterior canal which lasted under 1 minute. Recommend home with OPPT vestibular therapy f/u at discharge.  -AD       Row Name 03/10/25 1102          Therapy Assessment/Plan (PT)    Patient/Family Therapy Goals Statement (PT) decrease dizziness  -AD     Rehab Potential (PT) good  -AD     Criteria for Skilled Interventions Met (PT) yes;skilled treatment is necessary  -AD     Therapy Frequency (PT) daily  -AD               User Key  (r) = Recorded By, (t) = Taken By, (c) = Cosigned By      Initials Name Provider Type    AD Ruth Evans, PT Physical Therapist                   Outcome Measures       Row Name 03/10/25 0800          How much help from another person do you currently need...    Turning from your back to your side while in flat bed without using bedrails? 4  -MR     Moving from lying on back to sitting on the side of a flat bed without bedrails? 4  -MR     Moving to and from a bed to a chair (including a wheelchair)? 4  -MR     Standing up from a chair using your arms (e.g., wheelchair, bedside chair)? 4  -MR     Climbing 3-5 steps with a railing? 3  -MR     To walk in hospital room? 3  -MR     AM-PAC 6 Clicks Score (PT) 22  -MR               User Key  (r) = Recorded By, (t) = Taken By, (c) = Cosigned By      Initials Name Provider Type    Alfonso Tarango, RN Registered Nurse                                 Physical Therapy Education       Title: PT OT SLP Therapies (In Progress)       Topic: Physical Therapy (In  "Progress)       Point: Mobility training (Done)       Learning Progress Summary            Patient ALONSO Rausch VU by AD at 3/10/2025 1108                      Point: Home exercise program (Not Started)       Learner Progress:  Not documented in this visit.              Point: Body mechanics (Not Started)       Learner Progress:  Not documented in this visit.              Point: Precautions (Not Started)       Learner Progress:  Not documented in this visit.                              User Key       Initials Effective Dates Name Provider Type Discipline    AD 06/03/24 -  Ruth Evans, GILBERTO Physical Therapist PT                    Vestibular Exam    Smooth pursuit: normal   Spontaneous nystagmus: negative   Gaze holding nystagmus: negative   Saccades: inc dizziness but normal   Convergence/divergence: inc dizziness but normal   VOR slow: inc dizziness   Head thrust test: positive   Head shaking nystagmus test: negative   Tracey hallpike for posterior canal: positive left   Roll test for horizontal canal: NT       PT Recommendation and Plan  Planned Therapy Interventions (PT): balance training, gait training, patient/family education, vestibular therapy, transfer training, postural re-education, ROM (range of motion), strengthening, neuromuscular re-education, motor coordination training  Progress: no change  Outcome Evaluation: 45-year-old female past medical history significant for Hashimoto's presents after she had a near syncopal episode while she was sitting down on the porch talking to her dog and states \"the left side of my brain felt twitchy and I got anxious with a fast HR. CT head and chest negative for acute abnormality. Awaiting neuro consult today. PLOF is (I) with ADLs, community mobility, and work activities. Lives with  and children in 0STE home. Bed mobilty and ambulation independent, dynamic balance CGA, and vestibular testing shows positive head impulse test as well as positive left " posterior canalithiasis BPPV. She became increasingly dizziness with room spinning dizziness upon last postion of Epley for left posterior canal which lasted under 1 minute. Recommend home with OPPT vestibular therapy f/u at discharge.     Time Calculation:   PT Evaluation Complexity  History, PT Evaluation Complexity: 1-2 personal factors and/or comorbidities  Examination of Body Systems (PT Eval Complexity): total of 3 or more elements  Clinical Presentation (PT Evaluation Complexity): evolving  Clinical Decision Making (PT Evaluation Complexity): moderate complexity  Overall Complexity (PT Evaluation Complexity): moderate complexity     PT Charges       Row Name 03/10/25 1100             Time Calculation    Start Time 0950  -AD      Stop Time 1016  -AD      Time Calculation (min) 26 min  -AD      PT Received On 03/10/25  -AD      PT - Next Appointment 03/11/25  -AD      PT Goal Re-Cert Due Date 03/24/25  -AD         Time Calculation- PT    Total Timed Code Minutes- PT 0 minute(s)  -AD                User Key  (r) = Recorded By, (t) = Taken By, (c) = Cosigned By      Initials Name Provider Type    AD Ruth Evans, PT Physical Therapist                  Therapy Charges for Today       Code Description Service Date Service Provider Modifiers Qty    82504493875 HC PT EVAL MOD COMPLEXITY 4 3/10/2025 Ruth Evans, PT GP 1    02116493750 HC PT CANALITH REPOSITIONING PER DAY 3/10/2025 Ruth Evans, PT GP 1            PT G-Codes  AM-PAC 6 Clicks Score (PT): 22  PT Discharge Summary  Anticipated Discharge Disposition (PT): home, home with outpatient therapy services    Ruth Evans PT  3/10/2025

## 2025-03-10 NOTE — CASE MANAGEMENT/SOCIAL WORK
Discharge Planning Assessment   Nishant     Patient Name: Keyonna Kumari  MRN: [id number]  Today's Date: 3/10/2025    Admit Date: 3/9/2025    Plan: Routine home w/ family. OPPT ref to [facility], vestibular (order placed)   Discharge Needs Assessment       Row Name 03/10/25 1414       Living Environment    People in Home spouse    Name(s) of People in Home  Frantz and 2 children ages 18 and 14    Current Living Arrangements home    Potentially Unsafe Housing Conditions none    In the past 12 months has the electric, gas, oil, or water company threatened to shut off services in your home? No    Primary Care Provided by self    Provides Primary Care For no one    Family Caregiver if Needed spouse    Family Caregiver Names Frantz    Quality of Family Relationships helpful;involved;supportive    Able to Return to Prior Arrangements yes       Resource/Environmental Concerns    Resource/Environmental Concerns none    Transportation Concerns none       Transportation Needs    In the past 12 months, has lack of transportation kept you from medical appointments or from getting medications? no    In the past 12 months, has lack of transportation kept you from meetings, work, or from getting things needed for daily living? No       Food Insecurity    Within the past 12 months, you worried that your food would run out before you got the money to buy more. Never true    Within the past 12 months, the food you bought just didn't last and you didn't have money to get more. Never true       Transition Planning    Patient/Family Anticipates Transition to home with family    Patient/Family Anticipated Services at Transition none    Transportation Anticipated car, drives self;family or friend will provide       Discharge Needs Assessment    Readmission Within the Last 30 Days no previous admission in last 30 days    Equipment Currently Used at Home none    Concerns to be Addressed denies needs/concerns at this time     Anticipated Changes Related to Illness none    Equipment Needed After Discharge none                   Discharge Plan       Row Name 03/10/25 1415       Plan    Plan Routine home w/ family. OPPT ref to [facility], vestibular (order placed)    Provided Post Acute Provider List? Yes    Post Acute Provider List Outpatient Therapy    Delivered To Patient    Method of Delivery In person    Plan Comments CM met with patient and spouse Frantz at the bedside. Confirmed PCP, insurance, and pharmacy. M2B. Patient denies any difficulty affording medications. Patient is not current with any HHC/OPPT/OT services. Patient lives at home with her  and 2 children, is independent with ADLS/IADLS, and drives.  able to provide DC transort. PT eval suggests OPPT for vestibular services. Choice given for [facility]. CM placed referral and called main line to office and confirmed they have a therapist who is able to provide vestibular services. DC Barriers: pending MRI.                  Continued Care and Services - Admitted Since 3/9/2025       Therapy       Service Provider Request Status Services Address Phone Fax Patient Preferred    [facility] PHYSICAL THERAPY [facility] Pending - Request Sent -- [address], [place] [id number] [phone] --                  Expected Discharge Date and Time       Expected Discharge Date Expected Discharge Time    Mar 10, 2025            Demographic Summary       Row Name 03/10/25 1413       General Information    Admission Type observation    Arrived From emergency department    Referral Source admission list    Reason for Consult discharge planning    Preferred Language English       Contact Information    Permission Granted to Share Info With     Contact Information Obtained for                    Functional Status       Row Name 03/10/25 1414       Functional Status    Usual Activity Tolerance good    Current  Activity Tolerance good       Functional Status, IADL    Medications independent    Meal Preparation independent    Housekeeping independent    Laundry independent    Shopping independent           Anibal Hanley RN      Cell number [phone]  Office number [phone]

## 2025-03-10 NOTE — PLAN OF CARE
Goal Outcome Evaluation:    Patient sleeping between care. Denies pain/discomfort

## 2025-03-10 NOTE — CONSULTS
"Primary Care Provider: Provider, No Known     Consult requested by: Dr. Adames      Reason for Consultation: Neurological evaluation, dizziness     History taken from: patient chart    Chief complaint: dizziness        SUBJECTIVE:    History of present illness: Background per H&P: Keyonna Kumari is a 45-year-old female past medical history significant for Hashimoto's presents after she had a near syncopal episode while she was sitting down on the porch talking to her dog and states \"the left side of my brain felt twitchy and I got anxious with a fast HR.\" no associated chest pain or shortness of breath.  No recent illness.  No new medications or dose adjustments.  No swelling to her legs or feet.  No history of migraines or seizures.  Neurologically intact at this time.  No recent URI symptoms    - Portions of the above HPI were copied from previous encounters and edited as appropriate. PMH as detailed below.     Patient is a very pleasant 45-year-old female history.  History from patient and  at bedside.  Patient states that she was in normal state of health when she suddenly had bilateral loss of vision. She felt she had a \"twitching\" on the right side of her brain and that's the best way she could describe it. She then felt her heart racing. She was able to yell for her daughter but when her daughter came she was staring off and didn't really respond to her for several seconds. All together the spell lasted about 30 seconds. Afterwards she felt very drained and still continues to feel more fatigued than normal.  She did have a very short episode of dizziness afterwards and she described it as room spinning.  She denies loss of consciousness and states she was aware the entire time.  She denies feeling twitching on 1 side of the body, weakness, numbness.  She denies aphasia or dysarthria.  She did have a mild headache afterwards, no history of migraines.    No history of trauma, no history of syncope or " seizures.  No recent medication or lifestyle changes.  No recent stressors or changes.  She denies any other symptoms the past several months to year other than just following up routinely with her endocrinologist for her Hashimoto's disease.       Review of Systems   Constitutional:  Positive for fatigue.   HENT: Negative.     Eyes:  Positive for visual disturbance (Vision loss).   Respiratory: Negative.     Cardiovascular: Negative.    Gastrointestinal:  Negative for nausea and vomiting.   Endocrine: Negative.    Genitourinary: Negative.    Musculoskeletal: Negative.    Skin: Negative.    Allergic/Immunologic: Negative.    Neurological:  Positive for dizziness and headaches. Negative for tremors, seizures, syncope, facial asymmetry, speech difficulty, weakness, light-headedness and numbness.   Hematological: Negative.    Psychiatric/Behavioral:  Negative for confusion.         PATIENT HISTORY:  Past Medical History:   Diagnosis Date    Gestational diabetes     Hashimoto's disease     Hashimoto's disease     Hirsutism     Hyperlipidemia     Hypothyroidism     Polycystic ovary syndrome     Thyroid nodule     Vitamin D deficiency    ,   Past Surgical History:   Procedure Laterality Date     SECTION      X 3     GALLBLADDER SURGERY      LASER ABLATION      OVARY SURGERY      X 6    ,   Family History   Problem Relation Age of Onset    Hypertension Mother             Heart failure Mother     COPD Mother     Diabetes Father     Thyroid disease Sister     Diabetes Paternal Grandfather             Heart disease Paternal Grandfather     Prostate cancer Paternal Grandfather    ,   Social History     Tobacco Use    Smoking status: Never    Smokeless tobacco: Never   Vaping Use    Vaping status: Never Used   Substance Use Topics    Alcohol use: No    Drug use: Never   ,   Prior to Admission medications    Medication Sig Start Date End Date Taking? Authorizing Provider   hydroCHLOROthiazide 50 MG tablet  Take 1 tablet by mouth Daily. 6/6/24  Yes    levothyroxine (SYNTHROID, LEVOTHROID) 125 MCG tablet Take 1 tablet by mouth Daily. 2/28/24  Yes    meloxicam (MOBIC) 15 MG tablet Take 1 tablet by mouth Daily As Needed for Mild Pain or Moderate Pain.   Yes Provider, MD Gage   methocarbamol (ROBAXIN) 750 MG tablet Take 1 tablet by mouth 3 (Three) Times a Day As Needed for Muscle Spasms.   Yes Provider, MD Gage   vitamin B-12 (CYANOCOBALAMIN) 1000 MCG tablet Take 1 tablet by mouth Daily. 6/19/24  Yes    vitamin D (ERGOCALCIFEROL) 1.25 MG (03833 UT) capsule capsule Take 1 capsule by mouth 1 (One) Time Per Week. 6/19/24  Yes     Allergies:  Patient has no known allergies.    Current Facility-Administered Medications   Medication Dose Route Frequency Provider Last Rate Last Admin    acetaminophen (TYLENOL) tablet 650 mg  650 mg Oral Q4H PRN Delphine Guerra APRN        Or    acetaminophen (TYLENOL) 160 MG/5ML oral solution 650 mg  650 mg Oral Q4H PRN Delphine Guerra APRN        Or    acetaminophen (TYLENOL) suppository 650 mg  650 mg Rectal Q4H PRN Delphine Guerra APRN        sennosides-docusate (PERICOLACE) 8.6-50 MG per tablet 2 tablet  2 tablet Oral BID PRN Barb Ayala APRN        And    polyethylene glycol (MIRALAX) packet 17 g  17 g Oral Daily PRN Barb Ayala APRN        And    bisacodyl (DULCOLAX) EC tablet 5 mg  5 mg Oral Daily PRN Barb Ayala APRN        And    bisacodyl (DULCOLAX) suppository 10 mg  10 mg Rectal Daily PRN Barb Ayala APRN        enoxaparin sodium (LOVENOX) syringe 40 mg  40 mg Subcutaneous Daily Barb Ayala APRN   40 mg at 03/09/25 1714    hydroCHLOROthiazide tablet 50 mg  50 mg Oral Daily Delphine Guerra APRN        levothyroxine (SYNTHROID, LEVOTHROID) tablet 125 mcg  125 mcg Oral Q AM Delphine Guerra APRN        melatonin tablet 5 mg  5 mg Oral Nightly PRN Barb Ayala APRN   5 mg at 03/09/25 2318    nitroglycerin (NITROSTAT) SL tablet 0.4 mg  0.4  mg Sublingual Q5 Min PRN Delphine Guerra APRN        ondansetron ODT (ZOFRAN-ODT) disintegrating tablet 4 mg  4 mg Oral Q6H PRN Delphine Guerra APRN        Or    ondansetron (ZOFRAN) injection 4 mg  4 mg Intravenous Q6H PRN Delphine Guerra, MARITO        sodium chloride 0.9 % flush 10 mL  10 mL Intravenous PRN Barb Ayala, APRFATEMEH        sodium chloride 0.9 % flush 10 mL  10 mL Intravenous Q12H Barb Ayala, APRN   10 mL at 03/09/25 2318    sodium chloride 0.9 % flush 10 mL  10 mL Intravenous Q12H Delphine Guerra, APRN        sodium chloride 0.9 % flush 10 mL  10 mL Intravenous PRN Delphine Guerra, APRN        sodium chloride 0.9 % infusion 40 mL  40 mL Intravenous PRN Barb Ayala, APRN        sodium chloride 0.9 % infusion 40 mL  40 mL Intravenous PRN Delphine Guerra, APRN            ________________________________________________________        OBJECTIVE:      PHYSICAL EXAM:    Constitutional: The patient is in no apparent distress, bright awake and alert. There is no shortness of breath.     PSYCHIATRIC: Mood/affect normal, judgement normal, appropriate    HEENT:  Normocephalic, atraumatic.     Chest: Breathing unlabored    Cardiac: Regular rate and rhythm.     Extremities:  No clubbing, cyanosis or edema.    NEUROLOGICAL:    Cognition:   Fully oriented.  Fund of knowledge excellent.  Concentration and attention normal.   Language normal with normal comprehension, fluent speech, intact repetition and naming.   Short and long term memory appears intact    Cranial nerves;    II - pupils bilaterally equal reacting to light,  No new Visual field deficits;  Fundoscopic exam- Not able to be done, non-dilated exam  III,IV,VI: EOMI with no diplopia  V: Normal facial sensations  VII: No facial asymmetry,  VIII: No New hearing abnormality  IX, X, XI: normal gag and shoulder shrug;  XII: tongue is in the midline.    Sensory:  Intact to light touch in all extremities.     Motor: Strength 5/5 bilaterally upper and lower  extremities. No involuntary movements present. Normal tone and bulk.       Cerebellar: Finger to nose and mirror movements normal bilaterally.    Gait and balance: Deferred.     Physical exam performed by ANTONIETA Sharp.  ________________________________________________________   RESULTS REVIEW:    VITAL SIGNS:   Temp:  [97.7 °F (36.5 °C)-98.7 °F (37.1 °C)] 97.7 °F (36.5 °C)  Heart Rate:  [48-81] 69  Resp:  [12-20] 13  BP: (107-172)/(57-96) 123/76     LABS:      Lab 03/10/25  0524 03/09/25  1320   WBC 5.98 6.50   HEMOGLOBIN 13.3 14.8   HEMATOCRIT 41.5 44.9   PLATELETS 212 239   NEUTROS ABS 2.34 3.78   IMMATURE GRANS (ABS) 0.01 0.01   LYMPHS ABS 3.18* 2.29   MONOS ABS 0.30 0.28   EOS ABS 0.14 0.12   MCV 90.4 89.8   SED RATE 6  --    CRP <0.30  --          Lab 03/10/25  0524 03/09/25  1320   SODIUM 143 142   POTASSIUM 3.6 3.6   CHLORIDE 105 102   CO2 27.6 24.2   ANION GAP 10.4 15.8*   BUN 15 15   CREATININE 0.81 1.00   EGFR 92.5 71.8   GLUCOSE 98 108*   CALCIUM 8.8 8.9   MAGNESIUM  --  2.0   HEMOGLOBIN A1C 5.80*  --    TSH 4.310* 2.200         Lab 03/09/25  1320   TOTAL PROTEIN 6.4   ALBUMIN 4.3   GLOBULIN 2.1   ALT (SGPT) 14   AST (SGOT) 20   BILIRUBIN 0.4   ALK PHOS 55         Lab 03/10/25  0524 03/09/25  1553 03/09/25  1320   PROBNP 97.1  --   --    HSTROP T  --  <6 <6         Lab 03/10/25  0524   CHOLESTEROL 211*   LDL CHOL 130*   HDL CHOL 61*   TRIGLYCERIDES 113             UA          3/9/2025    13:50   Urinalysis   Specific Gravity, UA 1.007    Ketones, UA Negative    Blood, UA Negative    Leukocytes, UA Negative    Nitrite, UA Negative        Lab Results   Component Value Date    TSH 4.310 (H) 03/10/2025     (H) 03/10/2025    HGBA1C 5.80 (H) 03/10/2025    [id number] 03/23/2024       IMAGING STUDIES:  CT Head Without Contrast  Result Date: 3/9/2025  Impression: No acute intracranial abnormality. Electronically Signed: Abrahan Fortune MD  3/9/2025 2:20 PM EDT  Workstation ID: [id number]    XR Chest 1  View  Result Date: 3/9/2025  Impression: No acute cardiopulmonary abnormality is identified. Electronically Signed: Klarissa Styles  3/9/2025 2:20 PM EDT  Workstation ID: [id number]      I reviewed the patient's new clinical results.    ________________________________________________________     PROBLEM LIST:    Dizziness            ASSESSMENT/PLAN:    Transient spell of loss of vision, inability to respond and dizziness lasting about 30 seconds    NCCT negative  Check MRI brain wo (Dizziness, loss of vision, r/o ischemic injury)   Check CTA h/n (r/o dissection,  high grade stenosis less likely)     BP normal today   Pt likely has some sleep apnea, O2 frequently dropping during sleep-  recommend outpatient sleep study     Obesity- BMI 38.55-  lifestyle modifications encouraged       Plan     Will check MRI brain and CTA h/n. If negative, patient can be d/c home with plan for outpatient Neurology follow up. Patient may need EEG electively but will defer that for now. Spell sounds very atypical and it's unclear exactly what could have caused it. Does not sound like TIA. Seizure is within differential but this is a single event.       I discussed the patient's findings and my recommendations with patient, family, and nursing staff    MARITO Martinez  03/10/25  10:41 EDT

## 2025-03-10 NOTE — DISCHARGE SUMMARY
"[facility]    Octavio Kimble MD    CHIEF COMPLAINT:     Syncope     HISTORY OF PRESENT ILLNESS:    HPI    45-year-old female past medical history significant for Hashimoto's presents after she had a near syncopal episode while she was sitting down on the porch talking to her dog and states \"the left side of my brain felt twitchy and I got anxious with a fast HR.\" no associated chest pain or shortness of breath. No recent illness. No new medications or dose adjustments. No swelling to her legs or feet. No history of migraines or seizures. Neurologically intact at this time. No recent URI symptoms.    Past Medical History:   Diagnosis Date    Gestational diabetes     Hashimoto's disease     Hashimoto's disease     Hirsutism     Hyperlipidemia     Hypothyroidism     Polycystic ovary syndrome     Thyroid nodule     Vitamin D deficiency      Past Surgical History:   Procedure Laterality Date     SECTION      X 3     GALLBLADDER SURGERY      LASER ABLATION      OVARY SURGERY      X 6      Family History   Problem Relation Age of Onset    Hypertension Mother             Heart failure Mother     COPD Mother     Diabetes Father     Thyroid disease Sister     Diabetes Paternal Grandfather             Heart disease Paternal Grandfather     Prostate cancer Paternal Grandfather      Social History     Tobacco Use    Smoking status: Never    Smokeless tobacco: Never   Vaping Use    Vaping status: Never Used   Substance Use Topics    Alcohol use: No    Drug use: Never     Medications Prior to Admission   Medication Sig Dispense Refill Last Dose/Taking    hydroCHLOROthiazide 50 MG tablet Take 1 tablet by mouth Daily. 90 tablet 3 3/8/2025    levothyroxine (SYNTHROID, LEVOTHROID) 125 MCG tablet Take 1 tablet by mouth Daily. 90 tablet 3 3/9/2025 Morning    meloxicam (MOBIC) 15 MG tablet Take 1 tablet by mouth Daily As Needed for Mild Pain or Moderate Pain.   Taking As Needed "    methocarbamol (ROBAXIN) 750 MG tablet Take 1 tablet by mouth 3 (Three) Times a Day As Needed for Muscle Spasms.   Taking As Needed    vitamin B-12 (CYANOCOBALAMIN) 1000 MCG tablet Take 1 tablet by mouth Daily. 30 tablet 11 3/8/2025    vitamin D (ERGOCALCIFEROL) 1.25 MG (31763 UT) capsule capsule Take 1 capsule by mouth 1 (One) Time Per Week. 4 capsule 11 3/5/2025     Allergies:  Patient has no known allergies.    Immunization History   Administered Date(s) Administered    COVID-19 (PFIZER) Purple Cap Monovalent 03/09/2021, 03/30/2021    Fluzone Quad >6mos (Multi-dose) 11/01/2019           REVIEW OF SYSTEMS:    Review of Systems   Constitutional: Positive for malaise/fatigue.   Eyes: Negative.    Cardiovascular:  Negative for chest pain and dyspnea on exertion.   Respiratory: Negative.  Negative for shortness of breath.    Endocrine: Negative.    Skin: Negative.    Musculoskeletal: Negative.    Gastrointestinal: Negative.    Genitourinary: Negative.    Neurological:  Positive for dizziness and headaches.   Psychiatric/Behavioral: Negative.         Vital Signs  Temp:  [97.7 °F (36.5 °C)-98.2 °F (36.8 °C)] 97.7 °F (36.5 °C)  Heart Rate:  [48-74] 66  Resp:  [12-16] 13  BP: (107-172)/(57-96) 123/76          Physical Exam:  Physical Exam  Vitals and nursing note reviewed.   Constitutional:       Appearance: Normal appearance.   HENT:      Head: Normocephalic and atraumatic.      Right Ear: External ear normal.      Left Ear: External ear normal.      Nose: Nose normal.      Mouth/Throat:      Pharynx: Oropharynx is clear.   Eyes:      Conjunctiva/sclera: Conjunctivae normal.      Pupils: Pupils are equal, round, and reactive to light.   Cardiovascular:      Rate and Rhythm: Normal rate and regular rhythm.      Pulses: Normal pulses.   Pulmonary:      Effort: Pulmonary effort is normal.   Abdominal:      General: Bowel sounds are normal.   Musculoskeletal:         General: Normal range of motion.      Cervical back:  Normal range of motion.   Skin:     General: Skin is warm.      Capillary Refill: Capillary refill takes less than 2 seconds.   Neurological:      Mental Status: She is alert and oriented to person, place, and time.   Psychiatric:         Thought Content: Thought content normal.         Judgment: Judgment normal.         Emotional Behavior:    wnl   Debilities:   none  Results Review:    I reviewed the patient's new clinical results.  Lab Results (most recent)       Procedure Component Value Units Date/Time    Lipid Panel [[id number]]  (Abnormal) Collected: 03/10/25 0524    Specimen: Blood from Arm, Right Updated: 03/10/25 1006     Total Cholesterol 211 mg/dL      Triglycerides 113 mg/dL      HDL Cholesterol 61 mg/dL      LDL Cholesterol  130 mg/dL      VLDL Cholesterol 20 mg/dL      LDL/HDL Ratio 2.09    Narrative:      Cholesterol Reference Ranges  (U.S. Department of Health and Human Services ATP III Classifications)    Desirable          <200 mg/dL  Borderline High    200-239 mg/dL  High Risk          >240 mg/dL      Triglyceride Reference Ranges  (U.S. Department of Health and Human Services ATP III Classifications)    Normal           <150 mg/dL  Borderline High  150-199 mg/dL  High             200-499 mg/dL  Very High        >500 mg/dL    HDL Reference Ranges  (U.S. Department of Health and Human Services ATP III Classifications)    Low     <40 mg/dl (major risk factor for CHD)  High    >60 mg/dl ('negative' risk factor for CHD)        LDL Reference Ranges  (U.S. Department of Health and Human Services ATP III Classifications)    Optimal          <100 mg/dL  Near Optimal     100-129 mg/dL  Borderline High  130-159 mg/dL  High             160-189 mg/dL  Very High        >189 mg/dL    LDL is calculated using the NIH LDL-C calculation.      C-reactive Protein [[id number]]  (Normal) Collected: 03/10/25 0524    Specimen: Blood from Arm, Right Updated: 03/10/25 1006     C-Reactive Protein <0.30 mg/dL     Hemoglobin  A1c [[id number]]  (Abnormal) Collected: 03/10/25 0524    Specimen: Blood from Arm, Right Updated: 03/10/25 0931     Hemoglobin A1C 5.80 %     TSH [[id number]]  (Abnormal) Collected: 03/10/25 0524    Specimen: Blood from Arm, Right Updated: 03/10/25 0801     TSH 4.310 uIU/mL     T4, Free [[id number]]  (Normal) Collected: 03/10/25 0524    Specimen: Blood from Arm, Right Updated: 03/10/25 0801     Free T4 1.28 ng/dL     BNP [[id number]]  (Normal) Collected: 03/10/25 0524    Specimen: Blood from Arm, Right Updated: 03/10/25 0801     proBNP 97.1 pg/mL     Narrative:      This assay is used as an aid in the diagnosis of individuals suspected of having heart failure. It can be used as an aid in the diagnosis of acute decompensated heart failure (ADHF) in patients presenting with signs and symptoms of ADHF to the emergency department (ED). In addition, NT-proBNP of <300 pg/mL indicates ADHF is not likely.    Age Range Result Interpretation  NT-proBNP Concentration (pg/mL:      <50             Positive            >450                   Gray                 300-450                    Negative             <300    50-75           Positive            >900                  Gray                300-900                  Negative            <300      >75             Positive            >1800                  Gray                300-1800                  Negative            <300    T3, Free [[id number]] Collected: 03/10/25 0524    Specimen: Blood from Arm, Right Updated: 03/10/25 0740    Sedimentation Rate [[id number]]  (Normal) Collected: 03/10/25 0524    Specimen: Blood from Arm, Right Updated: 03/10/25 0724     Sed Rate 6 mm/hr     Basic Metabolic Panel [[id number]]  (Normal) Collected: 03/10/25 0524    Specimen: Blood from Arm, Right Updated: 03/10/25 0646     Glucose 98 mg/dL      BUN 15 mg/dL      Creatinine 0.81 mg/dL      Sodium 143 mmol/L      Potassium 3.6 mmol/L      Chloride 105 mmol/L      CO2 27.6 mmol/L      Calcium 8.8 mg/dL       BUN/Creatinine Ratio 18.5     Anion Gap 10.4 mmol/L      eGFR 92.5 mL/min/1.73     Narrative:      GFR Categories in Chronic Kidney Disease (CKD)      GFR Category          GFR (mL/min/1.73)    Interpretation  G1                     90 or greater         Normal or high (1)  G2                      60-89                Mild decrease (1)  G3a                   45-59                Mild to moderate decrease  G3b                   30-44                Moderate to severe decrease  G4                    15-29                Severe decrease  G5                    14 or less           Kidney failure          (1)In the absence of evidence of kidney disease, neither GFR category G1 or G2 fulfill the criteria for CKD.    eGFR calculation 2021 CKD-EPI creatinine equation, which does not include race as a factor    CBC Auto Differential [[id number]]  (Abnormal) Collected: 03/10/25 0524    Specimen: Blood from Arm, Right Updated: 03/10/25 0619     WBC 5.98 10*3/mm3      RBC 4.59 10*6/mm3      Hemoglobin 13.3 g/dL      Hematocrit 41.5 %      MCV 90.4 fL      MCH 29.0 pg      MCHC 32.0 g/dL      RDW 12.7 %      RDW-SD 41.6 fl      MPV 10.2 fL      Platelets 212 10*3/mm3      Neutrophil % 39.1 %      Lymphocyte % 53.2 %      Monocyte % 5.0 %      Eosinophil % 2.3 %      Basophil % 0.2 %      Immature Grans % 0.2 %      Neutrophils, Absolute 2.34 10*3/mm3      Lymphocytes, Absolute 3.18 10*3/mm3      Monocytes, Absolute 0.30 10*3/mm3      Eosinophils, Absolute 0.14 10*3/mm3      Basophils, Absolute 0.01 10*3/mm3      Immature Grans, Absolute 0.01 10*3/mm3      nRBC 0.0 /100 WBC     Respiratory Panel PCR w/COVID-19(SARS-CoV-2) [facility] In-House, NP Swab in UTM/VTM, 2 HR TAT - Swab, Nasopharynx [[id number]]  (Normal) Collected: 03/09/25 1552    Specimen: Swab from Nasopharynx Updated: 03/09/25 1651     ADENOVIRUS, PCR Not Detected     Coronavirus 229E Not Detected     Coronavirus HKU1 Not Detected     Coronavirus NL63  Not Detected     Coronavirus OC43 Not Detected     COVID19 Not Detected     Human Metapneumovirus Not Detected     Human Rhinovirus/Enterovirus Not Detected     Influenza A PCR Not Detected     Influenza B PCR Not Detected     Parainfluenza Virus 1 Not Detected     Parainfluenza Virus 2 Not Detected     Parainfluenza Virus 3 Not Detected     Parainfluenza Virus 4 Not Detected     RSV, PCR Not Detected     Bordetella pertussis pcr Not Detected     Bordetella parapertussis PCR Not Detected     Chlamydophila pneumoniae PCR Not Detected     Mycoplasma pneumo by PCR Not Detected    Narrative:      In the setting of a positive respiratory panel with a viral infection PLUS a negative procalcitonin without other underlying concern for bacterial infection, consider observing off antibiotics or discontinuation of antibiotics and continue supportive care. If the respiratory panel is positive for atypical bacterial infection (Bordetella pertussis, Chlamydophila pneumoniae, or Mycoplasma pneumoniae), consider antibiotic de-escalation to target atypical bacterial infection.    High Sensitivity Troponin T 1Hr [[id number]] Collected: 03/09/25 1553    Specimen: Blood Updated: 03/09/25 1641     HS Troponin T <6 ng/L      Troponin T Numeric Delta --     Comment: Unable to calculate.       Narrative:      High Sensitive Troponin T Reference Range:  <14.0 ng/L- Negative Female for AMI  <22.0 ng/L- Negative Male for AMI  >=14 - Abnormal Female indicating possible myocardial injury.  >=22 - Abnormal Male indicating possible myocardial injury.   Clinicians would have to utilize clinical acumen, EKG, Troponin, and serial changes to determine if it is an Acute Myocardial Infarction or myocardial injury due to an underlying chronic condition.         High Sensitivity Troponin T [[id number]]  (Normal) Collected: 03/09/25 1320    Specimen: Blood Updated: 03/09/25 1418     HS Troponin T <6 ng/L     Narrative:      High Sensitive Troponin T  Reference Range:  <14.0 ng/L- Negative Female for AMI  <22.0 ng/L- Negative Male for AMI  >=14 - Abnormal Female indicating possible myocardial injury.  >=22 - Abnormal Male indicating possible myocardial injury.   Clinicians would have to utilize clinical acumen, EKG, Troponin, and serial changes to determine if it is an Acute Myocardial Infarction or myocardial injury due to an underlying chronic condition.         TSH Rfx On Abnormal To Free T4 [[id number]]  (Normal) Collected: 03/09/25 1320    Specimen: Blood Updated: 03/09/25 1418     TSH 2.200 uIU/mL     Magnesium [[id number]]  (Normal) Collected: 03/09/25 1320    Specimen: Blood Updated: 03/09/25 1418     Magnesium 2.0 mg/dL     Comprehensive Metabolic Panel [[id number]]  (Abnormal) Collected: 03/09/25 1320    Specimen: Blood Updated: 03/09/25 1418     Glucose 108 mg/dL      BUN 15 mg/dL      Creatinine 1.00 mg/dL      Sodium 142 mmol/L      Potassium 3.6 mmol/L      Chloride 102 mmol/L      CO2 24.2 mmol/L      Calcium 8.9 mg/dL      Total Protein 6.4 g/dL      Albumin 4.3 g/dL      ALT (SGPT) 14 U/L      AST (SGOT) 20 U/L      Alkaline Phosphatase 55 U/L      Total Bilirubin 0.4 mg/dL      Globulin 2.1 gm/dL      A/G Ratio 2.0 g/dL      BUN/Creatinine Ratio 15.0     Anion Gap 15.8 mmol/L      eGFR 71.8 mL/min/1.73     Narrative:      GFR Categories in Chronic Kidney Disease (CKD)      GFR Category          GFR (mL/min/1.73)    Interpretation  G1                     90 or greater         Normal or high (1)  G2                      60-89                Mild decrease (1)  G3a                   45-59                Mild to moderate decrease  G3b                   30-44                Moderate to severe decrease  G4                    15-29                Severe decrease  G5                    14 or less           Kidney failure          (1)In the absence of evidence of kidney disease, neither GFR category G1 or G2 fulfill the criteria for CKD.    eGFR calculation  2021 CKD-EPI creatinine equation, which does not include race as a factor    Pregnancy, Urine - Urine, Clean Catch [[id number]]  (Normal) Collected: 03/09/25 1350    Specimen: Urine, Clean Catch Updated: 03/09/25 1402     HCG, Urine QL Negative    Urinalysis With Microscopic If Indicated (No Culture) - Urine, Clean Catch [[id number]]  (Normal) Collected: 03/09/25 1350    Specimen: Urine, Clean Catch Updated: 03/09/25 1400     Color, UA Yellow     Appearance, UA Clear     pH, UA 7.0     Specific Gravity, UA 1.007     Glucose, UA Negative     Ketones, UA Negative     Bilirubin, UA Negative     Blood, UA Negative     Protein, UA Negative     Leuk Esterase, UA Negative     Nitrite, UA Negative     Urobilinogen, UA 0.2 E.U./dL    Narrative:      Urine microscopic not indicated.    CBC & Differential [[id number]]  (Abnormal) Collected: 03/09/25 1320    Specimen: Blood Updated: 03/09/25 1352    Narrative:      The following orders were created for panel order CBC & Differential.  Procedure                               Abnormality         Status                     ---------                               -----------         ------                     CBC Auto Differential[[id number]]        Abnormal            Final result                 Please view results for these tests on the individual orders.    CBC Auto Differential [[id number]]  (Abnormal) Collected: 03/09/25 1320    Specimen: Blood Updated: 03/09/25 1352     WBC 6.50 10*3/mm3      RBC 5.00 10*6/mm3      Hemoglobin 14.8 g/dL      Hematocrit 44.9 %      MCV 89.8 fL      MCH 29.6 pg      MCHC 33.0 g/dL      RDW 12.4 %      RDW-SD 40.4 fl      MPV 10.2 fL      Platelets 239 10*3/mm3      Neutrophil % 58.2 %      Lymphocyte % 35.2 %      Monocyte % 4.3 %      Eosinophil % 1.8 %      Basophil % 0.3 %      Immature Grans % 0.2 %      Neutrophils, Absolute 3.78 10*3/mm3      Lymphocytes, Absolute 2.29 10*3/mm3      Monocytes, Absolute 0.28 10*3/mm3      Eosinophils, Absolute  0.12 10*3/mm3      Basophils, Absolute 0.02 10*3/mm3      Immature Grans, Absolute 0.01 10*3/mm3      nRBC 0.0 /100 WBC     [place] Draw [[id number]] Collected: 03/09/25 1320    Specimen: Blood Updated: 03/09/25 1330    Narrative:      The following orders were created for panel order [place] Draw.  Procedure                               Abnormality         Status                     ---------                               -----------         ------                     Green Top (Gel)[[id number]]                                  Final result               Lavender Top[[id number]]                                     Final result               Gold Top - SST[[id number]]                                   Final result               Light Blue Top[[id number]]                                   Final result                 Please view results for these tests on the individual orders.    Green Top (Gel) [[id number]] Collected: 03/09/25 1320    Specimen: Blood Updated: 03/09/25 1330     Extra Tube Hold for add-ons.     Comment: Auto resulted.       Lavender Top [[id number]] Collected: 03/09/25 1320    Specimen: Blood Updated: 03/09/25 1330     Extra Tube hold for add-on     Comment: Auto resulted       Gold Top - SST [[id number]] Collected: 03/09/25 1320    Specimen: Blood Updated: 03/09/25 1330     Extra Tube Hold for add-ons.     Comment: Auto resulted.       Light Blue Top [[id number]] Collected: 03/09/25 1320    Specimen: Blood Updated: 03/09/25 1330     Extra Tube Hold for add-ons.     Comment: Auto resulted               Imaging Results (Most Recent)       Procedure Component Value Units Date/Time    MRI Brain Without Contrast [[id number]] Resulted: 03/10/25 1223     Updated: 03/10/25 1241    CT Angiogram Head [[id number]] Resulted: 03/10/25 1157     Updated: 03/10/25 1157    CT Angiogram Carotids [[id number]] Resulted: 03/10/25 1157     Updated: 03/10/25 1157    XR Chest 1 View [[id number]] Collected: 03/09/25 1418     Updated:  03/09/25 1422    Narrative:      XR CHEST 1 VW    Date of Exam: 3/9/2025 2:06 PM EDT    Indication: syncope    Comparison: [facility] chest x-ray 4/15/2022    Findings:  Lungs are well expanded and clear. No pneumothorax or large pleural effusion is seen. Cardiomediastinal contours appear within normal limits.      Impression:      Impression:  No acute cardiopulmonary abnormality is identified.        Electronically Signed: Klarissa Styles    3/9/2025 2:20 PM EDT    Workstation ID: [id number]    CT Head Without Contrast [[id number]] Collected: 03/09/25 1418     Updated: 03/09/25 1422    Narrative:      CT HEAD WO CONTRAST    Date of Exam: 3/9/2025 2:04 PM EDT    Indication: syncope.    Comparison: None available.    Technique: Axial CT images were obtained of the head without contrast administration.  Coronal reconstructions were performed.  Automated exposure control and iterative reconstruction methods were used.      Findings:  No intracranial hemorrhage. No mass effect or midline shift. The ventricles and cortical sulci are normally configured. Gray-white matter differentiation maintained. Posterior fossa without acute abnormality. The globes are intact and symmetric. No   retro-orbital abnormality. The mastoid air cells are well-aerated. Leftward deviation of the nasal septum. Negative for calvarial fracture.      Impression:      Impression:  No acute intracranial abnormality.          Electronically Signed: Abrahan Fortune MD    3/9/2025 2:20 PM EDT    Workstation ID: [id number]          reviewed    ECG/EMG Results (most recent)       Procedure Component Value Units Date/Time    ECG 12 Lead Syncope [[id number]] Collected: 03/09/25 1258     Updated: 03/09/25 1259     QT Interval 412 ms      QTC Interval 457 ms     Narrative:      HEART RATE=74  bpm  RR Omegvrwm=380  ms  DC Uqgemxtl=534  ms  P Horizontal Axis=22  deg  P Front Axis=22  deg  QRSD Interval=78  ms  QT Mllhkfoy=733  ms  LEvI=435  ms  QRS Axis=41  deg  T Wave Axis=60   deg  - OTHERWISE NORMAL ECG -  Sinus rhythm  Low voltage, precordial leads  Date and Time of Study:2025-03-09 12:58:20    ECG 12 Lead Bradycardia [[id number]] Collected: 03/10/25 0514     Updated: 03/10/25 0516     QT Interval 434 ms      QTC Interval 431 ms     Narrative:      HEART RATE=59  bpm  RR Dcxrdela=9452  ms  MA Vzsnqwlu=586  ms  P Horizontal Axis=-60  deg  P Front Axis=0  deg  QRSD Interval=72  ms  QT Bljkrjeg=545  ms  IKxS=476  ms  QRS Axis=79  deg  T Wave Axis=-28  deg  - ABNORMAL ECG -  Sinus bradycardia  Prolonged MA interval  Low voltage, extremity and precordial leads  Nonspecific T abnormalities, anterior leads  Date and Time of Study:2025-03-10 05:14:32    Telemetry Scan [[id number]] Resulted: 03/09/25     Updated: 03/10/25 1115          reviewed        Results for orders placed during the hospital encounter of 04/26/22    Adult Transthoracic Echo Complete w/ Color, Spectral and Contrast if necessary per protocol    Interpretation Summary  · Estimated left ventricular EF = 60% Estimated left ventricular EF was in agreement with the calculated left ventricular EF. Left ventricular systolic function is normal.  · Estimated right ventricular systolic pressure from tricuspid regurgitation is normal (<35 mmHg).  · Left ventricular diastolic function was normal.      Microbiology Results (last 10 days)       Procedure Component Value - Date/Time    Respiratory Panel PCR w/COVID-19(SARS-CoV-2) [facility] In-House, NP Swab in UTM/VTM, 2 HR TAT - Swab, Nasopharynx [[id number]]  (Normal) Collected: 03/09/25 1552    Lab Status: Final result Specimen: Swab from Nasopharynx Updated: 03/09/25 1651     ADENOVIRUS, PCR Not Detected     Coronavirus 229E Not Detected     Coronavirus HKU1 Not Detected     Coronavirus NL63 Not Detected     Coronavirus OC43 Not Detected     COVID19 Not Detected     Human Metapneumovirus Not Detected     Human Rhinovirus/Enterovirus Not Detected     Influenza A PCR Not  Detected     Influenza B PCR Not Detected     Parainfluenza Virus 1 Not Detected     Parainfluenza Virus 2 Not Detected     Parainfluenza Virus 3 Not Detected     Parainfluenza Virus 4 Not Detected     RSV, PCR Not Detected     Bordetella pertussis pcr Not Detected     Bordetella parapertussis PCR Not Detected     Chlamydophila pneumoniae PCR Not Detected     Mycoplasma pneumo by PCR Not Detected    Narrative:      In the setting of a positive respiratory panel with a viral infection PLUS a negative procalcitonin without other underlying concern for bacterial infection, consider observing off antibiotics or discontinuation of antibiotics and continue supportive care. If the respiratory panel is positive for atypical bacterial infection (Bordetella pertussis, Chlamydophila pneumoniae, or Mycoplasma pneumoniae), consider antibiotic de-escalation to target atypical bacterial infection.            Assessment & Plan     Dizziness     Dizziness  -Troponins negative  -BNP 97.1  -CBC and CMP fairly unremarkable  -Respiratory viral panel negative  -CXR: no acute cardiopulmonary findings   -MRI Brain: no acute intracranial findings   -CTA: major arterial vasculature within head and neck appears widely patent, with no hemodynamically significant stenosis, dissection, thrombus, or aneurysm.   -Neurology and cardiology consulted     Hypertension/hyperlipidemia  BP Readings from Last 1 Encounters:   03/10/25 129/73   - Continue hydrochlorothiazide   - Add Lipitor  - Total cholesterol 211, HDL 61, , triglycerides 113  - Monitor while admitted    Hypothyroidism  -Continue levothyroxine   -TSH 4.3 and free T4 is 1.28, T3 pending    I discussed the patients findings and my recommendations with patient and family.     Discharge Diagnosis:      Dizziness      Hospital Course  Patient is a 45 y.o. female presented with near syncopal episode.  Patient states she was sitting on the porch yesterday and felt a twinge to the left side  brain with palpitations.  Patient reports no chest pain or shortness of breath.  Patient states most recent travel a month ago to [place] and did ride roller coasters but no injuries or falls.  Patient was seen by cardiology and neurology.  Troponins were negative.  BMP unremarkable.  CBC and CMP fairly unremarkable.  Respiratory viral panel was negative.  Chest x-ray showed no acute cardiopulmonary findings.  MRI brain showed no acute intercranial findings.  CTA showed no major stenosis dissection thrombus or aneurysm.  Patient was evaluated by physical therapy and did have spots on the left side for BPPV.  Patient started on meclizine with some improvement.  Patient had slight headache but reports no history of migraines.  Patient to follow-up with physical therapy outpatient.  Patient to take medication as prescribed.  Patient to follow-up with neurology cardiology and PCP outpatient.  Patient will have EEG and sleep study outpatient.  Testing recommendations reviewed with patient and family.  If symptoms worsen patient to call 911 or go to nearest ED.    Past Medical History:     Past Medical History:   Diagnosis Date    Gestational diabetes     Hashimoto's disease     Hashimoto's disease     Hirsutism     Hyperlipidemia     Hypothyroidism     Polycystic ovary syndrome     Thyroid nodule     Vitamin D deficiency        Past Surgical History:     Past Surgical History:   Procedure Laterality Date     SECTION      X 3     GALLBLADDER SURGERY      LASER ABLATION      OVARY SURGERY      X 6        Social History:   Social History     Socioeconomic History    Marital status:    Tobacco Use    Smoking status: Never    Smokeless tobacco: Never   Vaping Use    Vaping status: Never Used   Substance and Sexual Activity    Alcohol use: No    Drug use: Never    Sexual activity: Yes     Partners: Male     Birth control/protection: Tubal ligation       Procedures Performed         Consults:   Consults        Date and Time Order Name Status Description    3/9/2025  3:26 PM Inpatient Neurology Consult Other (see comments) Completed             Condition on Discharge:     Stable    Discharge Disposition      Discharge Medications     Discharge Medications        ASK your doctor about these medications        Instructions Start Date   hydroCHLOROthiazide 50 MG tablet   50 mg, Oral, Daily      levothyroxine 125 MCG tablet  Commonly known as: SYNTHROID, LEVOTHROID   125 mcg, Oral, Daily      meloxicam 15 MG tablet  Commonly known as: MOBIC  Ask about: Which instructions should I use?   15 mg, Daily PRN      methocarbamol 750 MG tablet  Commonly known as: ROBAXIN  Ask about: Which instructions should I use?   750 mg, 3 Times Daily PRN      vitamin B-12 1000 MCG tablet  Commonly known as: CYANOCOBALAMIN   1,000 mcg, Oral, Daily      vitamin D 1.25 MG (03459 UT) capsule capsule  Commonly known as: ERGOCALCIFEROL   50,000 Units, Oral, Weekly               Discharge Diet:     Activity at Discharge:     Follow-up Appointments  Future Appointments   Date Time Provider Department Center   8/26/2025  3:00 PM Nokhbehzaeim, Mahrokh, MD [facility]     Additional Instructions for the Follow-ups that You Need to Schedule       Ambulatory Referral to Neurology   As directed      Pt needs sleep apnea study and follow up for recent hospitalization.    Order Comments: Pt needs sleep apnea study and follow up for recent hospitalization.                 Test Results Pending at Discharge  Pending Results       Procedure [Order ID] Specimen - Date/Time    CT Angiogram Carotids [[id number]] Resulted: 03/10/25 1157     Updated: 03/10/25 1157    CT Angiogram Head [[id number]] Resulted: 03/10/25 1157     Updated: 03/10/25 1157    MRI Brain Without Contrast [[id number]] Resulted: 03/10/25 1223     Updated: 03/10/25 1241    T3, Free [[id number]] Collected: 03/10/25 0524    Specimen: Blood from Arm, Right Updated: 03/10/25 0740             Risk  for Readmission (LACE) Score: 1 (3/10/2025  6:00 AM)          Delphine GuerraMARITO  03/10/25  13:07 EDT        I spent 35 minutes caring for Keyonna on this date of service. This time includes time spent by me in the following activities: reviewing tests, performing a medically appropriate examination and/or evaluation, counseling and educating the patient/family/caregiver, referring and communicating with other health care professionals, documenting information in the medical record, independently interpreting results and communicating that information with the patient/family/caregiver, care coordination, ordering medications, ordering test(s), ordering procedure(s), obtaining a separately obtained history, and reviewing a separately obtained history.

## 2025-03-11 NOTE — CASE MANAGEMENT/SOCIAL WORK
Case Management Discharge Note      Final Note: Home w/ OPPT [facility]    Provided Post Acute Provider List?: Yes  Post Acute Provider List: Outpatient Therapy  Delivered To: Patient  Method of Delivery: In person    Selected Continued Care - Discharged on 3/10/2025 Admission date: 3/9/2025 - Discharge disposition: Home or Self Care          Therapy Coordination complete.      Service Provider Services Address Phone Fax Patient Preferred    [facility] PHYSICAL THERAPY [facility] [address] [id number] [phone] --                  Transportation Services  Private: Car    Final Discharge Disposition Code: 01 - home or self-care

## 2025-03-12 LAB
QT INTERVAL: 412 MS
QTC INTERVAL: 457 MS

## 2025-04-02 ENCOUNTER — PATIENT MESSAGE (OUTPATIENT)
Dept: ENDOCRINOLOGY | Age: 45
End: 2025-04-02
Payer: COMMERCIAL

## 2025-04-03 ENCOUNTER — OFFICE VISIT (OUTPATIENT)
Dept: SLEEP MEDICINE | Facility: CLINIC | Age: 45
End: 2025-04-03
Payer: COMMERCIAL

## 2025-04-03 VITALS
SYSTOLIC BLOOD PRESSURE: 124 MMHG | OXYGEN SATURATION: 100 % | BODY MASS INDEX: 38.45 KG/M2 | WEIGHT: 217 LBS | HEART RATE: 68 BPM | DIASTOLIC BLOOD PRESSURE: 73 MMHG | HEIGHT: 63 IN

## 2025-04-03 DIAGNOSIS — R06.83 SNORING: ICD-10-CM

## 2025-04-03 DIAGNOSIS — G47.19 EXCESSIVE DAYTIME SLEEPINESS: ICD-10-CM

## 2025-04-03 DIAGNOSIS — G47.8 NON-RESTORATIVE SLEEP: Primary | ICD-10-CM

## 2025-04-03 DIAGNOSIS — E66.812 CLASS 2 OBESITY WITH BODY MASS INDEX (BMI) OF 38.0 TO 38.9 IN ADULT, UNSPECIFIED OBESITY TYPE, UNSPECIFIED WHETHER SERIOUS COMORBIDITY PRESENT: ICD-10-CM

## 2025-04-03 DIAGNOSIS — G47.34 NOCTURNAL HYPOXIA: ICD-10-CM

## 2025-04-03 PROCEDURE — G0463 HOSPITAL OUTPT CLINIC VISIT: HCPCS

## 2025-04-03 RX ORDER — LEVOTHYROXINE SODIUM 125 UG/1
125 TABLET ORAL DAILY
Qty: 90 TABLET | Refills: 3 | Status: SHIPPED | OUTPATIENT
Start: 2025-04-03

## 2025-04-03 NOTE — PROGRESS NOTES
[facility]  [address] [place] [id number]  Phone   Fax      Keyonna Kumari  [id number]   1980  45 y.o.  female      Referring Provider: MARITO Mcgill  PCP: Octavio Kimble MD    Type of service: Initial Sleep Medicine Consult  Date of service: 4/3/2025          CHIEF COMPLAINT: Suspected sleep apnea, nocturnal hypoxia      HISTORY OF PRESENT ILLNESS:  Keyonna Kumari 45 y.o. was seen today on 4/3/2025 at [facility].  Patient has a history of Hashimoto's thyroiditis, prediabetes, elevated cholesterol, for which the patient follows with outside providers. Patient has no history of tonsillectomy, adenoidectomy, nasal surgery, UPPP. Patient presents today with symptoms of snoring, non-restorative sleep, and suspected sleep apnea.  The symptoms are chronic in nature.  She reports she had a recent hospitalization and was noted to have nocturnal hypoxia.  She was not discharged on any home oxygen or using any oxygen at home.  She believes she likely has sleep apnea.        SLEEP HISTORY:  Sleep schedule:  Bedtime: 11 PM  Wake time: 6:50 AM weekdays, 7:45 AM weekends  Time it takes to fall asleep: 30 to 40 minutes  Average hours of sleep: 6-7  Number of naps per day: 0    Symptoms:   In addition to the above, patient reports the following associated symptoms:  Have you ever awakened gasping for breath, coughing, choking: No   Change in weight:  Yes, gained 15 pounds  Morning headaches:  Yes   Awaken with a sore throat or dry mouth:  Yes   Leg jerking at night: Not involuntary, sometimes moves her legs at night to get comfortable in bed, denies creepy crawly feeling  Creepy crawly feeling in legs/urge to move legs: No   Teeth grinding: No   Have you ever awakened at night with a sour taste or burning sensation in your chest:  No   Do you have muscle weakness with laughing or anger:  No   Have you ever felt paralyzed while going to  "sleep or waking up:  Yes   Sleepwalking: No   Nightmares: No   Nocturia (urination at night): 2 times per night  Memory Problem: No     Medical Conditions (PMH):   Hashimoto's thyroiditis  Prediabetes  Elevated cholesterol  Trouble concentrating    Social history:  Do you drive a commercial vehicle:  No   Shift work:  No   Tobacco use:  No   Alcohol use: 0 per week  Caffeinated drinks: Half a cup of coffee per day  Drug use: No  Occupation: Works in medical office    Family History (parents and siblings) (pertaining to sleep medicine):  Disease  Hypertension  Thyroid disorder  COPD  Diabetes    Medications: reviewed    Allergies:  Patient has no known allergies.      REVIEW OF SYSTEMS:  Pertinent positive symptoms are:  Snoring  [place] Sleepiness Scale of Total score: 10   Fatigue  Dizziness: Had a dizziness episode was seen in the ER.  Being evaluated by neurology.  Joint pain      PHYSICAL EXAM:  CONSTITUTINONAL:   Vitals:    04/03/25 1100   BP: 124/73   BP Location: Left arm   Patient Position: Sitting   Pulse: 68   SpO2: 100%   Weight: 98.4 kg (217 lb)   Height: 160 cm (63\")    Body mass index is 38.44 kg/m².   HEAD: atraumatic, normocephalic   THROAT: tonsils are not significant, Mallampati class II-III  NECK: Neck Circumference: 14.5 inches, trachea is midline  RESPIRATORY SYSTEM: Respirations even, unlabored, normal rate  CARDIOVASULAR SYSTEM: Normal rate  NEUROLOGICAL SYSTEM: Alert and oriented x 3  PSYCHIATRIC SYSTEM: Mood is normal/ appropriate     Office note(s) from care team reviewed. Office note(s) reviewed: 3/2025 neurology consult note reviewed, 3/2025 discharge summary reviewed    Labs/ Test Results Reviewed:  TSH          2/19/2025    17:09 3/9/2025    13:20 3/10/2025    05:24   TSH   TSH 3.450  2.200  4.310       Most Recent A1C          3/10/2025    05:24   HGBA1C Most Recent   Hemoglobin A1C 5.80               ASSESSMENT AND PLAN:   Suspected sleep apnea: patient's symptoms and physical " examination are concerning for possible sleep apnea.   I discussed the signs, symptoms, and pathophysiology of sleep apnea with this patient.  I also discussed the possible complications of untreated sleep apnea including but not limited to potential risk of resistant hypertension, insulin resistance, pulmonary hypertension, atrial fibrillation or other arrhythmias, heart attack, stroke, nonrestorative sleep with hypersomnia which can increase risk for motor vehicle accidents, etc.   Different testing methods including home-based and lab based sleep studies were discussed with this patient.   Based on patient history and physical examination, will proceed with home sleep study.  The order for the sleep study is placed in [facility].  The test will be scheduled after prior authorization has been obtained through patient's insurance.  Discussed overview of treatment options for sleep apnea in the office today including PAP therapy, and treatment/ management will be discussed in more detail with this patient after the test is completed.  All questions were answered to patient's satisfaction.   Snoring: snoring is the sound created by turbulent airflow vibrating upper airway soft tissue.  I have also discussed factors affecting snoring including sleep deprivation, sleeping on the back and alcohol ingestion. To minimize snoring, patient is advised to have adequate sleep, sleep on their side, and avoid alcohol and sedative medications around bedtime.   Excessive daytime sleepiness: [place] Sleepiness Scale of Total score: 10.  There are many causes of daytime sleepiness and/or fatigue.  Rule out sleep apnea as a contributing factor, as above.  Do not drive, operate heavy machinery, or do activities that require high concentration if feeling tired/drowsy.  Obesity: Body mass index is 38.44 kg/m².. Patients who are overweight or obese are at increased risk of sleep apnea/ sleep disordered breathing. Weight reduction and healthy  lifestyle are encouraged in overweight/ obese patients as part of a comprehensive approach to sleep apnea treatment.       I have also discussed with the patient the following  Adequate amount of sleep: most people need around 7 to 9 hours of sleep each night        Patient will follow-up after study, 31 to 90 days after PAP therapy initiated if applicable, or contact the office sooner for questions or concerns. Patient's questions were answered.            Thank you again for asking me to consult on this patient.  Please do not hesitate to call me if you have additional questions or concerns.       Ana Singh DNP, APRN  [facility]

## 2025-04-10 ENCOUNTER — TREATMENT (OUTPATIENT)
Dept: PHYSICAL THERAPY | Facility: CLINIC | Age: 45
End: 2025-04-10
Payer: COMMERCIAL

## 2025-04-10 DIAGNOSIS — Z78.9 IMPAIRED MOBILITY AND ACTIVITIES OF DAILY LIVING: ICD-10-CM

## 2025-04-10 DIAGNOSIS — R42 VERTIGO: Primary | ICD-10-CM

## 2025-04-10 DIAGNOSIS — Z74.09 IMPAIRED MOBILITY AND ACTIVITIES OF DAILY LIVING: ICD-10-CM

## 2025-04-10 NOTE — PROGRESS NOTES
Physical Therapy Initial Evaluation and Plan of Care     [address], [address] [place]  [address]    Patient: Keyonna Kumari   : 1980  Diagnosis/ICD-10 Code:  Vertigo [R42]  Referring practitioner: Octavio Kimble*  Date of Initial Visit: 4/10/2025  Today's Date: 4/10/2025  Patient seen for 1 sessions           Subjective Questionnaire: DHI: 42%      Subjective Evaluation    History of Present Illness  Mechanism of injury: Pt reports she has had dizzy symptoms for less than a month.  She felt spinning, off balance, woozy.  She was seen in the ER and treated for BPPV.  It got better but then it came back.      Pt has difficulty with getting into and out of bed; bending to put her shoes on; laundry      Patient Occupation: desk work full time Pain  Current pain ratin    Social Support  Lives with: spouse and adult children    Patient Goals  Patient goals for therapy: improved balance, increased motion, increased strength, independence with ADLs/IADLs, return to sport/leisure activities and return to work         PRECAUTIONS: DM; Hashimoto's; vit D deficiency    Objective     Additional subjective information:   Vestibular testing completed:  none   Vision problems/correction:  glasses for reading   Hearing problems:  none   History of falls:  none      Symptoms last a matter of:  30 secs   Symptoms feel like:  spinning, rotation   Concurrent symptoms:  HA; nausea; aural fullness; tinnitus      Objective:     Oculomotor and VOR:      Spontaneous nystagmus:  neg    Gaze-evoked nystagmus:  neg    Skew deviation:  neg    Head-shake nystagmus:  deferred    Smooth pursuit:  neg but with wooziness    Saccades:  neg    VORc:  neg but with wooziness    Head thrust:  R/L  deferred        SVA:   deferred    DVA:  Horiz:              Vert:     LE strength:  Right:  deferred             Left:   LE AROM:  Right:                      Left:     Cervical AROM:  flex WNL; ext, rot bilat min limit with  "wooziness     Vertebral artery screen:  neg bilat     Cerebellar function:  UE:  finger to nose:  deferred                                                       JORDIN:                                     LE:  JORDIN:                                                       Heel to shin:     BPPV Assessment:    Roll Test:  Right:  neg           Left:  neg    Tracey Hallpike:  Right:  pos                          Left:  not tested    Pt became very sweaty, tearful and reported feeling really \"off\" after treatment;  she doubted she could work today  Her  joined us in the treatment area; educated them on sonia/phys and post rx restrictions.  Pt needed assist to walk to the waiting area.  Her  took her home.     MCTSIB:  cond 1:  deferred  `                           cond 2:                              cond 3:                              cond 4:     Gait: pt amb (I) without AD and no LOB/veering noted      Assessment & Plan       Assessment  Impairments: abnormal coordination, abnormal gait, activity intolerance, impaired balance, impaired physical strength, lacks appropriate home exercise program and safety issue   Functional limitations: carrying objects, lifting, sleeping, walking, pulling, pushing, moving in bed, standing, stooping, reaching overhead and unable to perform repetitive tasks   Assessment details: Pt is a 44 y/o female with the dx of vertigo onset less than a month ago    Pt has difficulty with  getting into and out of bed; bending to put her shoes on; laundry    Pt exhibits the above impairments and functional limitations and would benefit from skilled PT to address those areas and maximize function.       Prognosis: good    Goals  Plan Goals: STGs:  6 weeks  1.  Pt to tolerate initial HEP/post-treatment regimen.  2.  Pt to tolerate advancement of HEP as indicated.  3.  Pt to report at least 25% improvement in vertigo symptoms.  4.  Resolve BPPV as indicated by negative bilateral Roll Tests and Tracey " Brendon.     LTGs:  By DC  1.  Pt to be (I) with HEP to manage own condition.  2.  Pt to report at least 90% improvement in vertigo symptoms.  3.  Pt to improve function as indicated by improved score on DHI as compared to eval.  4.  Pt to improve balance as indicated by improved performance on MCTSIB/DGI.     Plan  Therapy options: will be seen for skilled therapy services  Planned therapy interventions: balance/weight-bearing training, body mechanics training, functional ROM exercises, gait training, home exercise program, flexibility, ADL retraining, manual therapy, motor coordination training, neuromuscular re-education, postural training, strengthening, stretching, therapeutic activities and transfer training  Frequency: 2x week  Duration in weeks: 12  Treatment plan discussed with: patient      See flowsheet for treatment details.    History # of Personal Factors and/or Comorbidities: MODERATE (1-2)  Examination of Body System(s): # of elements: MODERATE (3)  Clinical Presentation: EVOLVING  Clinical Decision Making: MODERATE      Timed:         Manual Therapy:        mins  13296;     Therapeutic Exercise:         mins  13012;     Neuromuscular Miranda: 10      mins  73840;    Therapeutic Activity:          mins  65998;     Gait Training:           mins  05248;     Ultrasound:          mins  76344;    Ionto:                                   mins   09524  Self Care:                       10     mins   11144    Un-Timed:  Electrical Stimulation:         mins  27729 ( );  Traction          mins 56868  Canalith Repos.          _10__  mins  19083  Low Eval          Mins  35072  Mod Eval    30      Mins  80444  High Eval                            Mins  44668  Re-Eval                               mins  82413    Timed Treatment:   20   mins   Total Treatment:     60   mins    PT SIGNATURE: Massiel Carcamo PT   [place] PT Lic. # [id number]    DATE TREATMENT INITIATED: 4/10/2025    Initial Certification:  Certification  Period: 7/8/2025  I certify that the therapy services are furnished while this patient is under my care.  The services outlined above are required by this patient, and will be reviewed every 90 days.     PHYSICIAN: Octavio Kimble MD  NPI: [id number]                                       DATE:     Please sign and return via fax to [phone].. Thank you, [facility].

## 2025-04-14 ENCOUNTER — TREATMENT (OUTPATIENT)
Dept: PHYSICAL THERAPY | Facility: CLINIC | Age: 45
End: 2025-04-14
Payer: COMMERCIAL

## 2025-04-14 DIAGNOSIS — Z78.9 IMPAIRED MOBILITY AND ACTIVITIES OF DAILY LIVING: ICD-10-CM

## 2025-04-14 DIAGNOSIS — Z74.09 IMPAIRED MOBILITY AND ACTIVITIES OF DAILY LIVING: ICD-10-CM

## 2025-04-14 DIAGNOSIS — R42 VERTIGO: Primary | ICD-10-CM

## 2025-04-14 PROCEDURE — 97112 NEUROMUSCULAR REEDUCATION: CPT | Performed by: PHYSICAL THERAPIST

## 2025-04-14 PROCEDURE — 97535 SELF CARE MNGMENT TRAINING: CPT | Performed by: PHYSICAL THERAPIST

## 2025-04-14 PROCEDURE — 97530 THERAPEUTIC ACTIVITIES: CPT | Performed by: PHYSICAL THERAPIST

## 2025-04-14 NOTE — PROGRESS NOTES
Physical Therapy Daily Treatment Note    Patient: Keyonna Kumari   : 1980  Referring practitioner: MARITO Mcgill  Diagnoses: Vertigo [R42]  Today's Date: 2025    VISIT#: 2    Subjective Evaluation    History of Present Illness  Mechanism of injury: Pt reports she feels much better but the day of treatment she felt bad and slept the rest of the day.    Pain  Current pain ratin         Objective     See Exercise, Manual, and Modality Logs for complete treatment.     Re-assessment:  neg all canals today  Discussed VOR and desensitiazation ex as possible for next visit    Assessment & Plan       Assessment  Assessment details: All canals neg today; pt reports feeling much improved      Progress per Plan of Care        Timed:         Manual Therapy:         mins  18311;     Therapeutic Exercise:         mins  55270;     Neuromuscular Miranda:  10      mins  32796;    Therapeutic Activity:     8     mins  33529;     Gait Training:           mins  64120;     Ultrasound:          mins  52447;    Ionto                                   mins   80186  Self Care                      8      mins   67847    Un-Timed:  Electrical Stimulation:         mins  31623 ( );  Traction          mins 06224  Canalith Repos                   mins  46025  Low Eval          Mins  50162  Mod Eval          Mins  38367  High Eval                            Mins  89347  Re-Eval                               mins  82104    Timed Treatment:  26    mins   Total Treatment:    26    mins    Massiel Carcamo PT  Physical Therapist  [place] PT Lic. # [id number]

## 2025-04-17 ENCOUNTER — HOSPITAL ENCOUNTER (OUTPATIENT)
Dept: SLEEP MEDICINE | Facility: HOSPITAL | Age: 45
Discharge: HOME | End: 2025-04-17
Admitting: NURSE PRACTITIONER
Payer: COMMERCIAL

## 2025-04-17 DIAGNOSIS — G47.19 EXCESSIVE DAYTIME SLEEPINESS: ICD-10-CM

## 2025-04-17 DIAGNOSIS — G47.34 NOCTURNAL HYPOXIA: ICD-10-CM

## 2025-04-17 DIAGNOSIS — G47.8 NON-RESTORATIVE SLEEP: ICD-10-CM

## 2025-04-17 DIAGNOSIS — R06.83 SNORING: ICD-10-CM

## 2025-04-17 DIAGNOSIS — E66.812 CLASS 2 OBESITY WITH BODY MASS INDEX (BMI) OF 38.0 TO 38.9 IN ADULT, UNSPECIFIED OBESITY TYPE, UNSPECIFIED WHETHER SERIOUS COMORBIDITY PRESENT: ICD-10-CM

## 2025-04-17 PROCEDURE — G0399 HOME SLEEP TEST/TYPE 3 PORTA: HCPCS

## 2025-04-21 ENCOUNTER — TELEPHONE (OUTPATIENT)
Dept: PHYSICAL THERAPY | Facility: CLINIC | Age: 45
End: 2025-04-21

## 2025-04-21 NOTE — TELEPHONE ENCOUNTER
Caller: Keyonna Kumari    Relationship: Self    What was the call regarding: UNABLE TO COME WORK CONFLICT.

## 2025-04-22 DIAGNOSIS — G47.33 OSA (OBSTRUCTIVE SLEEP APNEA): Primary | ICD-10-CM

## 2025-04-22 DIAGNOSIS — R06.83 SNORING: ICD-10-CM

## 2025-04-28 ENCOUNTER — TREATMENT (OUTPATIENT)
Dept: PHYSICAL THERAPY | Facility: CLINIC | Age: 45
End: 2025-04-28
Payer: COMMERCIAL

## 2025-04-28 DIAGNOSIS — Z74.09 IMPAIRED MOBILITY AND ACTIVITIES OF DAILY LIVING: ICD-10-CM

## 2025-04-28 DIAGNOSIS — R42 VERTIGO: Primary | ICD-10-CM

## 2025-04-28 DIAGNOSIS — Z78.9 IMPAIRED MOBILITY AND ACTIVITIES OF DAILY LIVING: ICD-10-CM

## 2025-04-28 PROCEDURE — 95992 CANALITH REPOSITIONING PROC: CPT | Performed by: PHYSICAL THERAPIST

## 2025-04-28 PROCEDURE — 97535 SELF CARE MNGMENT TRAINING: CPT | Performed by: PHYSICAL THERAPIST

## 2025-04-28 PROCEDURE — 97112 NEUROMUSCULAR REEDUCATION: CPT | Performed by: PHYSICAL THERAPIST

## 2025-04-28 NOTE — PROGRESS NOTES
Physical Therapy Daily Treatment Note    Patient: Keyonna Kumari   : 1980  Referring practitioner: MARITO Mcgill  Diagnoses: Vertigo [R42]  Today's Date: 2025    VISIT#: 3    Subjective Evaluation    History of Present Illness  Mechanism of injury: Pt reports she has been feeling some dizziness but not really spinning     Pain  Current pain ratin       Objective     See Exercise, Manual, and Modality Logs for complete treatment.     Re-assessed:  neg roll test bilat                          Neg L DH                          Pos R DH     Rx with mod epley R post with reminder of post rx restrictions; pt voices understanding     Assessment & Plan       Assessment  Assessment details: Renee well today with pos right post canal but less robust response vs the first visit      Progress per Plan of Care        Timed:         Manual Therapy:        mins  20261;     Therapeutic Exercise:         mins  84226;     Neuromuscular Miranda:  12      mins  54197;    Therapeutic Activity:          mins  18655;     Gait Training:           mins  27410;     Ultrasound:          mins  77100;    Ionto                                   mins   99750  Self Care                       8     mins   32672    Un-Timed:  Electrical Stimulation:         mins  95969 ( );  Traction          mins 93802  Canalith Repos             10      mins  70646  Low Eval          Mins  26438  Mod Eval          Mins  21510  High Eval                            Mins  27015  Re-Eval                               mins  66220    Timed Treatment:  30    mins   Total Treatment:    30    mins    Massiel Carcamo PT  Physical Therapist  [place] PT Lic. # [id number]

## 2025-05-01 ENCOUNTER — TREATMENT (OUTPATIENT)
Dept: PHYSICAL THERAPY | Facility: CLINIC | Age: 45
End: 2025-05-01
Payer: COMMERCIAL

## 2025-05-01 DIAGNOSIS — Z78.9 IMPAIRED MOBILITY AND ACTIVITIES OF DAILY LIVING: ICD-10-CM

## 2025-05-01 DIAGNOSIS — Z74.09 IMPAIRED MOBILITY AND ACTIVITIES OF DAILY LIVING: ICD-10-CM

## 2025-05-01 DIAGNOSIS — R42 VERTIGO: Primary | ICD-10-CM

## 2025-05-01 NOTE — PROGRESS NOTES
Physical Therapy Daily Treatment Note    Patient: Keyonna Kumari   : 1980  Referring practitioner: MARITO Mcgill  Diagnoses: Vertigo [R42]  Today's Date: 2025    VISIT#: 4    Subjective Evaluation    History of Present Illness  Mechanism of injury: She still has symptoms     Pain  Current pain ratin         Objective     See Exercise, Manual, and Modality Logs for complete treatment.     Re-assessed:  pos R post but more mild than before    Patient Education: 24 hr restrictions    Assessment & Plan       Assessment  Assessment details: Renee well       Progress per Plan of Care        Timed:         Manual Therapy:         mins  65935;     Therapeutic Exercise:         mins  14067;     Neuromuscular Miranda:  12      mins  09350;    Therapeutic Activity:          mins  82780;     Gait Training:           mins  64940;     Ultrasound:          mins  08563;    Ionto                                   mins   62435  Self Care                            mins   84487    Un-Timed:  Electrical Stimulation:         mins  67197 ( );  Traction          mins 24074  Canalith Repos            11       mins  54091  Low Eval          Mins  94236  Mod Eval          Mins  18109  High Eval                            Mins  04468  Re-Eval                               mins  84879    Timed Treatment:   23   mins   Total Treatment:     23   mins    Massiel Carcamo, PT  Physical Therapist  [place] PT Lic. # [id number]

## 2025-05-05 ENCOUNTER — TELEPHONE (OUTPATIENT)
Dept: PHYSICAL THERAPY | Facility: CLINIC | Age: 45
End: 2025-05-05

## 2025-05-05 NOTE — TELEPHONE ENCOUNTER
Caller: Keyonna Kumari    Relationship: Self         What was the call regarding: BEING SENT TO [place] OFFICE TO WORK TODAY

## 2025-05-12 ENCOUNTER — TELEPHONE (OUTPATIENT)
Dept: SLEEP MEDICINE | Facility: CLINIC | Age: 45
End: 2025-05-12
Payer: COMMERCIAL

## 2025-05-12 NOTE — TELEPHONE ENCOUNTER
Called patient, no answer. Left voicemail. Patient is needing to be scheduled for compliance follow up. Patient is needing to be scheduled between 06/08/25 - 8/6/2025. If patients calls back, please schedule.

## 2025-05-14 ENCOUNTER — TELEPHONE (OUTPATIENT)
Dept: PHYSICAL THERAPY | Facility: CLINIC | Age: 45
End: 2025-05-14

## 2025-05-27 ENCOUNTER — TELEPHONE (OUTPATIENT)
Dept: SLEEP MEDICINE | Facility: CLINIC | Age: 45
End: 2025-05-27
Payer: COMMERCIAL

## 2025-05-27 NOTE — TELEPHONE ENCOUNTER
Called patient, no answer. Left voicemail. Patient is needing to be scheduled for her compliance follow up. Patient was set up on 5/8/2025. Patient is needing to be scheduled between 06/08/25 - 8/6/25. If patient calls back, please schedule.

## 2025-07-17 ENCOUNTER — OFFICE VISIT (OUTPATIENT)
Dept: SLEEP MEDICINE | Facility: CLINIC | Age: 45
End: 2025-07-17
Payer: COMMERCIAL

## 2025-07-17 VITALS
BODY MASS INDEX: 39.51 KG/M2 | HEART RATE: 67 BPM | HEIGHT: 63 IN | WEIGHT: 223 LBS | DIASTOLIC BLOOD PRESSURE: 75 MMHG | OXYGEN SATURATION: 100 % | SYSTOLIC BLOOD PRESSURE: 118 MMHG

## 2025-07-17 DIAGNOSIS — E66.812 CLASS 2 SEVERE OBESITY WITH SERIOUS COMORBIDITY AND BODY MASS INDEX (BMI) OF 39.0 TO 39.9 IN ADULT, UNSPECIFIED OBESITY TYPE: ICD-10-CM

## 2025-07-17 DIAGNOSIS — G47.33 OSA (OBSTRUCTIVE SLEEP APNEA): Primary | ICD-10-CM

## 2025-07-17 DIAGNOSIS — E66.01 CLASS 2 SEVERE OBESITY WITH SERIOUS COMORBIDITY AND BODY MASS INDEX (BMI) OF 39.0 TO 39.9 IN ADULT, UNSPECIFIED OBESITY TYPE: ICD-10-CM

## 2025-07-17 PROCEDURE — G0463 HOSPITAL OUTPT CLINIC VISIT: HCPCS

## 2025-07-17 NOTE — PROGRESS NOTES
"  [address] [address]  Phone   Fax         SLEEP CLINIC FOLLOW-UP PROGRESS NOTE    Keyonna Kumari  [id number]   1980  45 y.o.  female      PCP: Octavio Kimble MD    DATE OF VISIT: 7/17/2025          CHIEF COMPLAINT: Obstructive sleep apnea    HPI:  This is a 45 y.o. year old patient who presents to the clinic today for the management of obstructive sleep apnea.  Patient had a(n) home sleep study 4/2025 showing obstructive sleep apnea with AHI of 7.3/hr.  This patient is using positive airway pressure therapy with auto CPAP.   Patient's sleep apnea has improved with this therapy with residual AHI 0.6/hr.   Average usage is 5 hours 48 minutes per night on nights used.   Patient improved with treatment.  Was an adjustment to get used to the PAP device, but feels tolerance is improving.  Still has some fatigue during the day but not sleepiness.  Denies issues staying awake at work or with driving.  Does feel like some of her fatigue could be from autoimmune disorder.  Dog does wake her up some at night.  Getting 6 to 7 hours of sleep per night on average.  We discussed that most people do need 7 to 9 hours of sleep per night, recommended trying to increase sleep time for overall health.        MEDICATIONS: reviewed     ALLERGIES:  Patient has no known allergies.    SOCIAL HISTORY (habits pertaining to sleep medicine):  Tobacco use: No   Alcohol use: 0 per week  Caffeine use: 1     REVIEW OF SYSTEMS:   Pertinent positive symptoms are:  [place] Sleepiness Scale :Total score: 1         PHYSICAL EXAMINATION:  CONSTITUTIONAL:  Vitals:    07/17/25 0900   BP: 118/75   BP Location: Left arm   Patient Position: Sitting   Pulse: 67   SpO2: 100%   Weight: 101 kg (223 lb)   Height: 160 cm (63\")    Body mass index is 39.5 kg/m².   HEAD: atraumatic, normocephalic  RESP SYSTEM: not in respiratory distress, breathing unlabored  CARDIOVASULAR: " normal rate, no edema noted   NEURO: Alert and oriented x 3, mood and affect appeared appropriate      DATA REVIEWED:  The PAP compliance summary downloaded on 7/8/2025 has been reviewed independently by me and discussed with the patient.   Compliance: 97%  More than 4 hr use: 83%  Average use of the device: 5 hours 48 minutes per night  Residual AHI: 0.6/hr (goal < 5.0 /hr)  Device: ResMed air sense 10  DME: Joelle Velasquez          ASSESSMENT AND PLAN:  Obstructive Sleep Apnea: sleep apnea has improved with the device and treatment.  Patient has excellent compliance with the device for treatment of sleep apnea.  I have personally reviewed the smart card download and discussed the download data with the patient and encouarged continued use of the device.  The residual AHI is acceptable. The device is benefiting the patient and the device is medically necessary.  Recommend patient get supplies from the [employer] company, change them on a regular basis, and clean as directed.  A prescription for supplies has been sent to the [employer] company.  Recommend continued usage of PAP device, most insurances require minimum usage of 4 hours per night at least 70% of the time, would recommend using all night every night if possible for optimum health.  Recommend prioritizing sleep to aid in overall health.  Do not drive or operate heavy machinery or do activities that require high concentration if feeling tired or drowsy.  Severe Obesity w/comorbidity: Body mass index is 39.5 kg/m².. Patients who are overweight or obese are at increased risk of sleep apnea/ sleep disordered breathing. Weight reduction and healthy lifestyle are encouraged in overweight/ obese patients as part of a comprehensive approach to sleep apnea treatment.         Patient will follow-up in 6 months or follow-up sooner for any issues or concerns.  Patient's questions were answered.          Thank you for allowing me to participate in the care of this patient.      Ana Singh, ISABELL, APRN  [facility]

## 2025-08-06 ENCOUNTER — OFFICE VISIT (OUTPATIENT)
Dept: NEUROLOGY | Facility: CLINIC | Age: 45
End: 2025-08-06
Payer: COMMERCIAL

## 2025-08-06 VITALS
SYSTOLIC BLOOD PRESSURE: 124 MMHG | BODY MASS INDEX: 39.51 KG/M2 | WEIGHT: 223 LBS | HEART RATE: 66 BPM | HEIGHT: 63 IN | DIASTOLIC BLOOD PRESSURE: 78 MMHG

## 2025-08-06 DIAGNOSIS — R55 SYNCOPE AND COLLAPSE: Primary | ICD-10-CM

## 2025-08-06 PROCEDURE — 99214 OFFICE O/P EST MOD 30 MIN: CPT | Performed by: NURSE PRACTITIONER

## 2025-08-06 RX ORDER — ONDANSETRON 8 MG/1
TABLET, ORALLY DISINTEGRATING ORAL
COMMUNITY

## 2025-08-26 ENCOUNTER — LAB (OUTPATIENT)
Facility: HOSPITAL | Age: 45
End: 2025-08-26
Payer: COMMERCIAL

## 2025-08-26 ENCOUNTER — OFFICE VISIT (OUTPATIENT)
Dept: ENDOCRINOLOGY | Age: 45
End: 2025-08-26
Payer: COMMERCIAL

## 2025-08-26 VITALS
DIASTOLIC BLOOD PRESSURE: 72 MMHG | SYSTOLIC BLOOD PRESSURE: 126 MMHG | OXYGEN SATURATION: 96 % | WEIGHT: 222.2 LBS | HEART RATE: 80 BPM | TEMPERATURE: 98.4 F | BODY MASS INDEX: 39.37 KG/M2 | HEIGHT: 63 IN

## 2025-08-26 DIAGNOSIS — E55.9 VITAMIN D DEFICIENCY: ICD-10-CM

## 2025-08-26 DIAGNOSIS — E04.1 THYROID NODULE: ICD-10-CM

## 2025-08-26 DIAGNOSIS — E06.3 HASHIMOTO'S THYROIDITIS: Primary | ICD-10-CM

## 2025-08-26 DIAGNOSIS — R73.03 PREDIABETES: ICD-10-CM

## 2025-08-26 PROCEDURE — 99214 OFFICE O/P EST MOD 30 MIN: CPT | Performed by: STUDENT IN AN ORGANIZED HEALTH CARE EDUCATION/TRAINING PROGRAM

## 2025-08-26 PROCEDURE — 36415 COLL VENOUS BLD VENIPUNCTURE: CPT | Performed by: STUDENT IN AN ORGANIZED HEALTH CARE EDUCATION/TRAINING PROGRAM

## 2025-08-28 LAB
ENDOMYSIUM IGA SER QL: NEGATIVE
GLIADIN PEPTIDE IGA SER-ACNC: 2 UNITS (ref 0–19)
GLIADIN PEPTIDE IGG SER-ACNC: 2 UNITS (ref 0–19)
IGA SERPL-MCNC: 115 MG/DL (ref 87–352)
TTG IGA SER-ACNC: <2 U/ML (ref 0–3)
TTG IGG SER-ACNC: 3 U/ML (ref 0–5)